# Patient Record
Sex: FEMALE | Race: BLACK OR AFRICAN AMERICAN | NOT HISPANIC OR LATINO
[De-identification: names, ages, dates, MRNs, and addresses within clinical notes are randomized per-mention and may not be internally consistent; named-entity substitution may affect disease eponyms.]

---

## 2018-05-16 ENCOUNTER — APPOINTMENT (OUTPATIENT)
Dept: OPHTHALMOLOGY | Facility: CLINIC | Age: 58
End: 2018-05-16
Payer: MEDICAID

## 2018-05-16 PROCEDURE — 92004 COMPRE OPH EXAM NEW PT 1/>: CPT

## 2020-04-03 ENCOUNTER — INPATIENT (INPATIENT)
Facility: HOSPITAL | Age: 60
LOS: 5 days | Discharge: HOME | End: 2020-04-09
Attending: HOSPITALIST | Admitting: HOSPITALIST
Payer: MEDICAID

## 2020-04-03 VITALS
SYSTOLIC BLOOD PRESSURE: 155 MMHG | TEMPERATURE: 100 F | OXYGEN SATURATION: 96 % | HEART RATE: 97 BPM | RESPIRATION RATE: 18 BRPM | DIASTOLIC BLOOD PRESSURE: 84 MMHG

## 2020-04-03 DIAGNOSIS — E87.6 HYPOKALEMIA: ICD-10-CM

## 2020-04-03 DIAGNOSIS — F25.9 SCHIZOAFFECTIVE DISORDER, UNSPECIFIED: ICD-10-CM

## 2020-04-03 DIAGNOSIS — Z29.9 ENCOUNTER FOR PROPHYLACTIC MEASURES, UNSPECIFIED: ICD-10-CM

## 2020-04-03 DIAGNOSIS — E87.1 HYPO-OSMOLALITY AND HYPONATREMIA: ICD-10-CM

## 2020-04-03 DIAGNOSIS — B34.9 VIRAL INFECTION, UNSPECIFIED: ICD-10-CM

## 2020-04-03 DIAGNOSIS — Z02.9 ENCOUNTER FOR ADMINISTRATIVE EXAMINATIONS, UNSPECIFIED: ICD-10-CM

## 2020-04-03 DIAGNOSIS — E11.9 TYPE 2 DIABETES MELLITUS WITHOUT COMPLICATIONS: ICD-10-CM

## 2020-04-03 DIAGNOSIS — J96.01 ACUTE RESPIRATORY FAILURE WITH HYPOXIA: ICD-10-CM

## 2020-04-03 DIAGNOSIS — I10 ESSENTIAL (PRIMARY) HYPERTENSION: ICD-10-CM

## 2020-04-03 LAB
ALBUMIN SERPL ELPH-MCNC: 3.8 G/DL — SIGNIFICANT CHANGE UP (ref 3.3–5)
ALP SERPL-CCNC: 59 U/L — SIGNIFICANT CHANGE UP (ref 40–120)
ALT FLD-CCNC: 20 U/L — SIGNIFICANT CHANGE UP (ref 4–33)
ANION GAP SERPL CALC-SCNC: 16 MMO/L — HIGH (ref 7–14)
APTT BLD: 30.5 SEC — SIGNIFICANT CHANGE UP (ref 27.5–36.3)
AST SERPL-CCNC: 25 U/L — SIGNIFICANT CHANGE UP (ref 4–32)
BASE EXCESS BLDV CALC-SCNC: 4.4 MMOL/L — SIGNIFICANT CHANGE UP
BASOPHILS # BLD AUTO: 0.02 K/UL — SIGNIFICANT CHANGE UP (ref 0–0.2)
BASOPHILS NFR BLD AUTO: 0.2 % — SIGNIFICANT CHANGE UP (ref 0–2)
BILIRUB SERPL-MCNC: 0.5 MG/DL — SIGNIFICANT CHANGE UP (ref 0.2–1.2)
BLOOD GAS VENOUS - CREATININE: 0.47 MG/DL — LOW (ref 0.5–1.3)
BUN SERPL-MCNC: 8 MG/DL — SIGNIFICANT CHANGE UP (ref 7–23)
CALCIUM SERPL-MCNC: 9.2 MG/DL — SIGNIFICANT CHANGE UP (ref 8.4–10.5)
CHLORIDE BLDV-SCNC: 99 MMOL/L — SIGNIFICANT CHANGE UP (ref 96–108)
CHLORIDE SERPL-SCNC: 92 MMOL/L — LOW (ref 98–107)
CK MB BLD-MCNC: 1.39 NG/ML — SIGNIFICANT CHANGE UP (ref 1–4.7)
CK SERPL-CCNC: 191 U/L — HIGH (ref 25–170)
CO2 SERPL-SCNC: 25 MMOL/L — SIGNIFICANT CHANGE UP (ref 22–31)
CREAT SERPL-MCNC: 0.45 MG/DL — LOW (ref 0.5–1.3)
CRP SERPL-MCNC: 160.2 MG/L — HIGH
D DIMER BLD IA.RAPID-MCNC: 607 NG/ML — SIGNIFICANT CHANGE UP
EOSINOPHIL # BLD AUTO: 0 K/UL — SIGNIFICANT CHANGE UP (ref 0–0.5)
EOSINOPHIL NFR BLD AUTO: 0 % — SIGNIFICANT CHANGE UP (ref 0–6)
ERYTHROCYTE [SEDIMENTATION RATE] IN BLOOD: 44 MM/HR — HIGH (ref 4–25)
FERRITIN SERPL-MCNC: 293.3 NG/ML — HIGH (ref 15–150)
GAS PNL BLDV: 135 MMOL/L — LOW (ref 136–146)
GLUCOSE BLDV-MCNC: 246 MG/DL — HIGH (ref 70–99)
GLUCOSE SERPL-MCNC: 261 MG/DL — HIGH (ref 70–99)
HCO3 BLDV-SCNC: 27 MMOL/L — SIGNIFICANT CHANGE UP (ref 20–27)
HCT VFR BLD CALC: 33.1 % — LOW (ref 34.5–45)
HCT VFR BLDV CALC: 38 % — SIGNIFICANT CHANGE UP (ref 34.5–45)
HGB BLD-MCNC: 11.9 G/DL — SIGNIFICANT CHANGE UP (ref 11.5–15.5)
HGB BLDV-MCNC: 12.4 G/DL — SIGNIFICANT CHANGE UP (ref 11.5–15.5)
IMM GRANULOCYTES NFR BLD AUTO: 2.2 % — HIGH (ref 0–1.5)
INR BLD: 1.2 — HIGH (ref 0.88–1.17)
LACTATE BLDV-MCNC: 1.7 MMOL/L — SIGNIFICANT CHANGE UP (ref 0.5–2)
LDH SERPL L TO P-CCNC: 418 U/L — HIGH (ref 135–225)
LYMPHOCYTES # BLD AUTO: 1.21 K/UL — SIGNIFICANT CHANGE UP (ref 1–3.3)
LYMPHOCYTES # BLD AUTO: 15.1 % — SIGNIFICANT CHANGE UP (ref 13–44)
MCHC RBC-ENTMCNC: 27 PG — SIGNIFICANT CHANGE UP (ref 27–34)
MCHC RBC-ENTMCNC: 36 % — SIGNIFICANT CHANGE UP (ref 32–36)
MCV RBC AUTO: 75.1 FL — LOW (ref 80–100)
MONOCYTES # BLD AUTO: 0.29 K/UL — SIGNIFICANT CHANGE UP (ref 0–0.9)
MONOCYTES NFR BLD AUTO: 3.6 % — SIGNIFICANT CHANGE UP (ref 2–14)
NEUTROPHILS # BLD AUTO: 6.32 K/UL — SIGNIFICANT CHANGE UP (ref 1.8–7.4)
NEUTROPHILS NFR BLD AUTO: 78.9 % — HIGH (ref 43–77)
NRBC # FLD: 0 K/UL — SIGNIFICANT CHANGE UP (ref 0–0)
PCO2 BLDV: 44 MMHG — SIGNIFICANT CHANGE UP (ref 41–51)
PH BLDV: 7.43 PH — SIGNIFICANT CHANGE UP (ref 7.32–7.43)
PLATELET # BLD AUTO: 398 K/UL — SIGNIFICANT CHANGE UP (ref 150–400)
PMV BLD: 10.4 FL — SIGNIFICANT CHANGE UP (ref 7–13)
PO2 BLDV: 35 MMHG — SIGNIFICANT CHANGE UP (ref 35–40)
POTASSIUM BLDV-SCNC: 2.9 MMOL/L — CRITICAL LOW (ref 3.4–4.5)
POTASSIUM SERPL-MCNC: 3.1 MMOL/L — LOW (ref 3.5–5.3)
POTASSIUM SERPL-SCNC: 3.1 MMOL/L — LOW (ref 3.5–5.3)
PROCALCITONIN SERPL-MCNC: 0.13 NG/ML — HIGH (ref 0.02–0.1)
PROT SERPL-MCNC: 7.4 G/DL — SIGNIFICANT CHANGE UP (ref 6–8.3)
PROTHROM AB SERPL-ACNC: 13.7 SEC — HIGH (ref 9.8–13.1)
RBC # BLD: 4.41 M/UL — SIGNIFICANT CHANGE UP (ref 3.8–5.2)
RBC # FLD: 13.3 % — SIGNIFICANT CHANGE UP (ref 10.3–14.5)
SAO2 % BLDV: 66.1 % — SIGNIFICANT CHANGE UP (ref 60–85)
SODIUM SERPL-SCNC: 133 MMOL/L — LOW (ref 135–145)
TROPONIN T, HIGH SENSITIVITY: 6 NG/L — SIGNIFICANT CHANGE UP (ref ?–14)
WBC # BLD: 8.02 K/UL — SIGNIFICANT CHANGE UP (ref 3.8–10.5)
WBC # FLD AUTO: 8.02 K/UL — SIGNIFICANT CHANGE UP (ref 3.8–10.5)

## 2020-04-03 PROCEDURE — 71045 X-RAY EXAM CHEST 1 VIEW: CPT | Mod: 26

## 2020-04-03 RX ORDER — ACETAMINOPHEN 500 MG
650 TABLET ORAL EVERY 6 HOURS
Refills: 0 | Status: DISCONTINUED | OUTPATIENT
Start: 2020-04-03 | End: 2020-04-09

## 2020-04-03 RX ORDER — ACETAMINOPHEN 500 MG
650 TABLET ORAL ONCE
Refills: 0 | Status: COMPLETED | OUTPATIENT
Start: 2020-04-03 | End: 2020-04-03

## 2020-04-03 RX ORDER — ENOXAPARIN SODIUM 100 MG/ML
40 INJECTION SUBCUTANEOUS ONCE
Refills: 0 | Status: COMPLETED | OUTPATIENT
Start: 2020-04-03 | End: 2020-04-03

## 2020-04-03 RX ORDER — ENOXAPARIN SODIUM 100 MG/ML
40 INJECTION SUBCUTANEOUS DAILY
Refills: 0 | Status: DISCONTINUED | OUTPATIENT
Start: 2020-04-04 | End: 2020-04-09

## 2020-04-03 RX ORDER — ALBUTEROL 90 UG/1
2 AEROSOL, METERED ORAL EVERY 6 HOURS
Refills: 0 | Status: DISCONTINUED | OUTPATIENT
Start: 2020-04-03 | End: 2020-04-09

## 2020-04-03 RX ORDER — POTASSIUM CHLORIDE 20 MEQ
40 PACKET (EA) ORAL ONCE
Refills: 0 | Status: COMPLETED | OUTPATIENT
Start: 2020-04-03 | End: 2020-04-03

## 2020-04-03 RX ADMIN — Medication 40 MILLIEQUIVALENT(S): at 12:28

## 2020-04-03 RX ADMIN — Medication 650 MILLIGRAM(S): at 10:34

## 2020-04-03 RX ADMIN — Medication 40 MILLIGRAM(S): at 11:03

## 2020-04-03 RX ADMIN — ENOXAPARIN SODIUM 40 MILLIGRAM(S): 100 INJECTION SUBCUTANEOUS at 11:03

## 2020-04-03 NOTE — ED ADULT NURSE NOTE - CHIEF COMPLAINT QUOTE
Pt brought in by EMS from home complaining of SOB and fever x few days. Pt SpO2 on RA was 88%, pt arrives on a non rebreather, SpO2 96%. Pt was seen at [facility] yesterday and arrives today with IV still in L AC;.

## 2020-04-03 NOTE — ED PROVIDER NOTE - CLINICAL SUMMARY MEDICAL DECISION MAKING FREE TEXT BOX
59F with hx of schizophrenia, ?DM, ?HTN presenting with 2 weeks of fever, chills, cough and 2 days of dyspnea. Ill-appearing, febilre, tachypneic and hypoxic. Will check COVID labs, CXR, EKG, likely tba.

## 2020-04-03 NOTE — ED PROVIDER NOTE - PHYSICAL EXAMINATION
GENERAL: febrile, ill-appearing  HEAD: normocephalic, atraumatic  HEENT: normal conjunctiva, oral mucosa moist, neck supple  CARDIAC: tachycardic  PULM: tachypneic to 30s, satting high 80s on RA at rest, mid-80s during exertion  GI: abdomen nondistended, soft, nontender, no guarding or rebound tenderness  NEURO: alert and oriented x 3, normal speech, PERRLA, EOMI, no focal motor or sensory deficits  MSK: no visible deformities, no peripheral edema, calf tenderness/redness/swelling  SKIN: no visible rashes, dry, well-perfused  PSYCH: appropriate mood and affect

## 2020-04-03 NOTE — ED PROVIDER NOTE - NS ED ROS FT
GENERAL: + fever, chills  HEENT: + cough  CARDIAC: no chest pain, palpitations, lightheadedness  PULM: + dyspnea  GI: no abdominal pain, nausea, vomiting, diarrhea, constipation, melena, hematochezia  : no urinary dysuria, frequency, incontinence, hematuria  NEURO: no headache, motor weakness, sensory changes  MSK: no joint or muscle pain  SKIN: no rashes, hives  HEME: no active bleeding, bruising

## 2020-04-03 NOTE — H&P ADULT - HISTORY OF PRESENT ILLNESS
58 y/o Female. PMH of Schizophrenia (not on medications), DM and HTN (not on medications). Presents with a complain of shortness of breath. Patient states that for the past 2 weeks she has been expiriencing fevers, chills and a non-productive cough and has also developed shortness of breath. PER EMS patient went to [facility] yesterday and was sent home. On arrival to the ED patient found to be hypoxic to the low 80's on RA. Patient denied any other symptom, no nausea, vomiting, chest pain, abdominal pain or any other urinary symptoms.   Patients MELCHOR Figueroa (#[phone])

## 2020-04-03 NOTE — ED ADULT TRIAGE NOTE - CHIEF COMPLAINT QUOTE
Pt brought in by EMS from home complaining of SOB and fever x few days. Pt SpO2 on RA was 88%, pt arrives on a non rebreather, SpO2 96%. Pt was seen at [facility] yesterday and arrives today with IV still in L AC;.

## 2020-04-03 NOTE — H&P ADULT - PROBLEM SELECTOR PLAN 1
Likely secondary to Viral URI, r/p COVID 19.   COVID 19 PCR Pending.  CXR Airspace opacities in the bilateral lower lobes and the lateral aspect of the left upper lobe. Perihilar opacities are also suspected. Cardiomediastinal silhouette is intact.  Albuterol PRN SOB/WHeezing  Tylenol PRN Fever  Maintain O2% >90%, currently on NC.

## 2020-04-03 NOTE — ED PROVIDER NOTE - OBJECTIVE STATEMENT
59F with hx of schizophrenia, ?DM, ?HTN presenting with 2 weeks of fever, chills, cough and 2 days of dyspnea. Per EMS, patient first went to [facility] yesterday, had a work-up done, went home. Unclear if patient was discharged or eloped. Per EMS, patient would desat to low 80s on RA. In ED, patient satting high 80s on RA at rest, desatting to mid-80s when exerting herself. Patient denies abdominal pain, n/v/d, recent travel, sick contacts, leg pain/swelling/redness, chest pain.

## 2020-04-03 NOTE — ED PROVIDER NOTE - ATTENDING CONTRIBUTION TO CARE
59 f presenting with symptoms suspicious for covid 19 hypoxic resp failure.  will send covid labs, provide 02 therapy. admit for further treatment and monitoring

## 2020-04-03 NOTE — H&P ADULT - ASSESSMENT
60 y/o Female. PMH of Schizophrenia (not on medications), DM and HTN (not on medications). Presents with shortness of breath, fevers, chills and a non-productive cough. On arrival to the ED patient found to be hypoxic to the low 80's on RA. Admitted for Acute Respiratory Failure with Hypoxia.

## 2020-04-03 NOTE — ED PROVIDER NOTE - PROGRESS NOTE DETAILS
Figueroa: admitted to hospitalist for COVID work up, hypoxia. Figueroa: spoke with patient's sister. Patient's healthcare proxy is her mother. Per sister, patient is full-code. Will revoke patient's DNR/DNI status.

## 2020-04-04 DIAGNOSIS — B97.21 SARS-ASSOCIATED CORONAVIRUS AS THE CAUSE OF DISEASES CLASSIFIED ELSEWHERE: ICD-10-CM

## 2020-04-04 LAB
ALBUMIN SERPL ELPH-MCNC: 3.5 G/DL — SIGNIFICANT CHANGE UP (ref 3.3–5)
ALP SERPL-CCNC: 59 U/L — SIGNIFICANT CHANGE UP (ref 40–120)
ALT FLD-CCNC: 19 U/L — SIGNIFICANT CHANGE UP (ref 4–33)
ANION GAP SERPL CALC-SCNC: 18 MMO/L — HIGH (ref 7–14)
ANION GAP SERPL CALC-SCNC: 18 MMO/L — HIGH (ref 7–14)
AST SERPL-CCNC: 25 U/L — SIGNIFICANT CHANGE UP (ref 4–32)
BASOPHILS # BLD AUTO: 0.04 K/UL — SIGNIFICANT CHANGE UP (ref 0–0.2)
BASOPHILS NFR BLD AUTO: 0.5 % — SIGNIFICANT CHANGE UP (ref 0–2)
BILIRUB SERPL-MCNC: 0.4 MG/DL — SIGNIFICANT CHANGE UP (ref 0.2–1.2)
BUN SERPL-MCNC: 10 MG/DL — SIGNIFICANT CHANGE UP (ref 7–23)
BUN SERPL-MCNC: 10 MG/DL — SIGNIFICANT CHANGE UP (ref 7–23)
CALCIUM SERPL-MCNC: 9.3 MG/DL — SIGNIFICANT CHANGE UP (ref 8.4–10.5)
CALCIUM SERPL-MCNC: 9.3 MG/DL — SIGNIFICANT CHANGE UP (ref 8.4–10.5)
CHLORIDE SERPL-SCNC: 94 MMOL/L — LOW (ref 98–107)
CHLORIDE SERPL-SCNC: 94 MMOL/L — LOW (ref 98–107)
CO2 SERPL-SCNC: 23 MMOL/L — SIGNIFICANT CHANGE UP (ref 22–31)
CO2 SERPL-SCNC: 23 MMOL/L — SIGNIFICANT CHANGE UP (ref 22–31)
CREAT SERPL-MCNC: 0.37 MG/DL — LOW (ref 0.5–1.3)
CREAT SERPL-MCNC: 0.37 MG/DL — LOW (ref 0.5–1.3)
EOSINOPHIL # BLD AUTO: 0 K/UL — SIGNIFICANT CHANGE UP (ref 0–0.5)
EOSINOPHIL NFR BLD AUTO: 0 % — SIGNIFICANT CHANGE UP (ref 0–6)
GLUCOSE BLDC GLUCOMTR-MCNC: 313 MG/DL — HIGH (ref 70–99)
GLUCOSE BLDC GLUCOMTR-MCNC: 317 MG/DL — HIGH (ref 70–99)
GLUCOSE SERPL-MCNC: 217 MG/DL — HIGH (ref 70–99)
GLUCOSE SERPL-MCNC: 217 MG/DL — HIGH (ref 70–99)
HCT VFR BLD CALC: 33.5 % — LOW (ref 34.5–45)
HCV AB S/CO SERPL IA: 1.57 S/CO — HIGH (ref 0–0.99)
HCV AB SERPL-IMP: SIGNIFICANT CHANGE UP
HGB BLD-MCNC: 12.1 G/DL — SIGNIFICANT CHANGE UP (ref 11.5–15.5)
IMM GRANULOCYTES NFR BLD AUTO: 3 % — HIGH (ref 0–1.5)
LYMPHOCYTES # BLD AUTO: 1.22 K/UL — SIGNIFICANT CHANGE UP (ref 1–3.3)
LYMPHOCYTES # BLD AUTO: 15.3 % — SIGNIFICANT CHANGE UP (ref 13–44)
MAGNESIUM SERPL-MCNC: 2.1 MG/DL — SIGNIFICANT CHANGE UP (ref 1.6–2.6)
MCHC RBC-ENTMCNC: 27.3 PG — SIGNIFICANT CHANGE UP (ref 27–34)
MCHC RBC-ENTMCNC: 36.1 % — HIGH (ref 32–36)
MCV RBC AUTO: 75.5 FL — LOW (ref 80–100)
MONOCYTES # BLD AUTO: 0.25 K/UL — SIGNIFICANT CHANGE UP (ref 0–0.9)
MONOCYTES NFR BLD AUTO: 3.1 % — SIGNIFICANT CHANGE UP (ref 2–14)
NEUTROPHILS # BLD AUTO: 6.23 K/UL — SIGNIFICANT CHANGE UP (ref 1.8–7.4)
NEUTROPHILS NFR BLD AUTO: 78.1 % — HIGH (ref 43–77)
NRBC # FLD: 0 K/UL — SIGNIFICANT CHANGE UP (ref 0–0)
PHOSPHATE SERPL-MCNC: 2.4 MG/DL — LOW (ref 2.5–4.5)
PLATELET # BLD AUTO: 433 K/UL — HIGH (ref 150–400)
PMV BLD: 11 FL — SIGNIFICANT CHANGE UP (ref 7–13)
POTASSIUM SERPL-MCNC: 4 MMOL/L — SIGNIFICANT CHANGE UP (ref 3.5–5.3)
POTASSIUM SERPL-MCNC: 4 MMOL/L — SIGNIFICANT CHANGE UP (ref 3.5–5.3)
POTASSIUM SERPL-SCNC: 4 MMOL/L — SIGNIFICANT CHANGE UP (ref 3.5–5.3)
POTASSIUM SERPL-SCNC: 4 MMOL/L — SIGNIFICANT CHANGE UP (ref 3.5–5.3)
PROT SERPL-MCNC: 7.1 G/DL — SIGNIFICANT CHANGE UP (ref 6–8.3)
RBC # BLD: 4.44 M/UL — SIGNIFICANT CHANGE UP (ref 3.8–5.2)
RBC # FLD: 13.5 % — SIGNIFICANT CHANGE UP (ref 10.3–14.5)
SARS-COV-2 RNA SPEC QL NAA+PROBE: DETECTED
SODIUM SERPL-SCNC: 135 MMOL/L — SIGNIFICANT CHANGE UP (ref 135–145)
SODIUM SERPL-SCNC: 135 MMOL/L — SIGNIFICANT CHANGE UP (ref 135–145)
WBC # BLD: 7.98 K/UL — SIGNIFICANT CHANGE UP (ref 3.8–10.5)
WBC # FLD AUTO: 7.98 K/UL — SIGNIFICANT CHANGE UP (ref 3.8–10.5)

## 2020-04-04 PROCEDURE — 99233 SBSQ HOSP IP/OBS HIGH 50: CPT

## 2020-04-04 RX ORDER — DEXTROSE 50 % IN WATER 50 %
12.5 SYRINGE (ML) INTRAVENOUS ONCE
Refills: 0 | Status: DISCONTINUED | OUTPATIENT
Start: 2020-04-04 | End: 2020-04-09

## 2020-04-04 RX ORDER — POLYETHYLENE GLYCOL 3350 17 G/17G
17 POWDER, FOR SOLUTION ORAL DAILY
Refills: 0 | Status: DISCONTINUED | OUTPATIENT
Start: 2020-04-04 | End: 2020-04-09

## 2020-04-04 RX ORDER — SENNA PLUS 8.6 MG/1
2 TABLET ORAL AT BEDTIME
Refills: 0 | Status: DISCONTINUED | OUTPATIENT
Start: 2020-04-04 | End: 2020-04-09

## 2020-04-04 RX ORDER — DEXTROSE 50 % IN WATER 50 %
25 SYRINGE (ML) INTRAVENOUS ONCE
Refills: 0 | Status: DISCONTINUED | OUTPATIENT
Start: 2020-04-04 | End: 2020-04-09

## 2020-04-04 RX ORDER — SODIUM CHLORIDE 9 MG/ML
1000 INJECTION, SOLUTION INTRAVENOUS
Refills: 0 | Status: DISCONTINUED | OUTPATIENT
Start: 2020-04-04 | End: 2020-04-09

## 2020-04-04 RX ORDER — INSULIN LISPRO 100/ML
VIAL (ML) SUBCUTANEOUS
Refills: 0 | Status: DISCONTINUED | OUTPATIENT
Start: 2020-04-04 | End: 2020-04-09

## 2020-04-04 RX ORDER — DEXTROSE 50 % IN WATER 50 %
15 SYRINGE (ML) INTRAVENOUS ONCE
Refills: 0 | Status: DISCONTINUED | OUTPATIENT
Start: 2020-04-04 | End: 2020-04-09

## 2020-04-04 RX ORDER — INSULIN LISPRO 100/ML
VIAL (ML) SUBCUTANEOUS AT BEDTIME
Refills: 0 | Status: DISCONTINUED | OUTPATIENT
Start: 2020-04-04 | End: 2020-04-09

## 2020-04-04 RX ORDER — GLUCAGON INJECTION, SOLUTION 0.5 MG/.1ML
1 INJECTION, SOLUTION SUBCUTANEOUS ONCE
Refills: 0 | Status: DISCONTINUED | OUTPATIENT
Start: 2020-04-04 | End: 2020-04-09

## 2020-04-04 RX ORDER — SODIUM,POTASSIUM PHOSPHATES 278-250MG
1 POWDER IN PACKET (EA) ORAL
Refills: 0 | Status: COMPLETED | OUTPATIENT
Start: 2020-04-04 | End: 2020-04-05

## 2020-04-04 RX ADMIN — SENNA PLUS 2 TABLET(S): 8.6 TABLET ORAL at 23:47

## 2020-04-04 RX ADMIN — Medication 2: at 23:47

## 2020-04-04 RX ADMIN — Medication 2: at 08:59

## 2020-04-04 RX ADMIN — Medication 1 PACKET(S): at 18:37

## 2020-04-04 RX ADMIN — ENOXAPARIN SODIUM 40 MILLIGRAM(S): 100 INJECTION SUBCUTANEOUS at 12:25

## 2020-04-04 RX ADMIN — Medication 1 PACKET(S): at 10:14

## 2020-04-04 RX ADMIN — Medication 1 PACKET(S): at 12:25

## 2020-04-04 RX ADMIN — Medication 4: at 12:23

## 2020-04-04 NOTE — PROGRESS NOTE ADULT - PROBLEM SELECTOR PLAN 1
Likely secondary to COVID 19 PCR positive   Saturating % on 2 L   CXR Airspace opacities in the bilateral lower lobes and the lateral aspect of the left upper lobe. Perihilar opacities are also suspected. Cardiomediastinal silhouette is intact.  On Albuterol PRN for  SOB/Wheezing  Attempt to titrate down O2 as tolerated to maintain sats of 94% and above

## 2020-04-04 NOTE — PROGRESS NOTE ADULT - ASSESSMENT
58 y/o Female. PMH of Schizophrenia (not on medications), DM and HTN (not on medications). Presents with shortness of breath, fevers, chills and a non-productive cough. On arrival to the ED patient found to be hypoxic to the low 80's on RA. Admitted for Acute Respiratory Failure with Hypoxia.

## 2020-04-04 NOTE — PROGRESS NOTE ADULT - SUBJECTIVE AND OBJECTIVE BOX
PROGRESS NOTE:     Patient is a 59y old  Female who presents with a chief complaint of Shortness of Breath (03 Apr 2020 19:52)      SUBJECTIVE / OVERNIGHT EVENTS:    ADDITIONAL REVIEW OF SYSTEMS:    MEDICATIONS  (STANDING):  dextrose 5%. 1000 milliLiter(s) (50 mL/Hr) IV Continuous <Continuous>  dextrose 50% Injectable 12.5 Gram(s) IV Push once  dextrose 50% Injectable 25 Gram(s) IV Push once  dextrose 50% Injectable 25 Gram(s) IV Push once  enoxaparin Injectable 40 milliGRAM(s) SubCutaneous daily  insulin lispro (HumaLOG) corrective regimen sliding scale   SubCutaneous three times a day before meals  insulin lispro (HumaLOG) corrective regimen sliding scale   SubCutaneous at bedtime  potassium phosphate / sodium phosphate powder 1 Packet(s) Oral three times a day with meals    MEDICATIONS  (PRN):  acetaminophen   Tablet .. 650 milliGRAM(s) Oral every 6 hours PRN Temp greater or equal to 38C (100.4F), Mild Pain (1 - 3)  ALBUTerol    90 MICROgram(s) HFA Inhaler 2 Puff(s) Inhalation every 6 hours PRN Shortness of Breath and/or Wheezing  dextrose 40% Gel 15 Gram(s) Oral once PRN Blood Glucose LESS THAN 70 milliGRAM(s)/deciliter  glucagon  Injectable 1 milliGRAM(s) IntraMuscular once PRN Glucose LESS THAN 70 milligrams/deciliter      CAPILLARY BLOOD GLUCOSE        I&O's Summary    03 Apr 2020 07:01  -  04 Apr 2020 07:00  --------------------------------------------------------  IN: 360 mL / OUT: 0 mL / NET: 360 mL        PHYSICAL EXAM:  Vital Signs Last 24 Hrs  T(C): 36.7 (04 Apr 2020 01:33), Max: 37.1 (03 Apr 2020 14:35)  T(F): 98.1 (04 Apr 2020 01:33), Max: 98.8 (03 Apr 2020 14:35)  HR: 82 (04 Apr 2020 01:33) (82 - 91)  BP: 129/60 (04 Apr 2020 01:33) (129/60 - 149/72)  BP(mean): --  RR: 18 (04 Apr 2020 01:33) (18 - 20)  SpO2: 94% (04 Apr 2020 01:33) (94% - 100%)    CONSTITUTIONAL: NAD, well-developed  RESPIRATORY: Normal respiratory effort; lungs are clear to auscultation bilaterally  CARDIOVASCULAR: Regular rate and rhythm, normal S1 and S2, no murmur/rub/gallop; No lower extremity edema; Peripheral pulses are 2+ bilaterally  ABDOMEN: Nontender to palpation, normoactive bowel sounds, no rebound/guarding; No hepatosplenomegaly  MUSCLOSKELETAL: no clubbing or cyanosis of digits; no joint swelling or tenderness to palpation  PSYCH: A+O to person, place, and time; affect appropriate  NEURO:  SKIN:    LABS:                        12.1   7.98  )-----------( 433      ( 04 Apr 2020 06:00 )             33.5     04-04    135  |  94<L>  |  10  ----------------------------<  217<H>  4.0   |  23  |  0.37<L>    Ca    9.3      04 Apr 2020 06:00  Phos  2.4     04-04  Mg     2.1     04-04    TPro  7.1  /  Alb  3.5  /  TBili  0.4  /  DBili  x   /  AST  25  /  ALT  19  /  AlkPhos  59  04-04    PT/INR - ( 03 Apr 2020 10:15 )   PT: 13.7 SEC;   INR: 1.20          PTT - ( 03 Apr 2020 10:15 )  PTT:30.5 SEC  CARDIAC MARKERS ( 03 Apr 2020 10:15 )  x     / x     / 191 u/L / 1.39 ng/mL / x                RADIOLOGY & ADDITIONAL TESTS:  Results Reviewed:   Imaging Personally Reviewed:  Electrocardiogram Personally Reviewed:    COORDINATION OF CARE:  Care Discussed with Consultants/Other Providers [Y/N]:  Prior or Outpatient Records Reviewed [Y/N]: PROGRESS NOTE:     Patient is a 59y old  Female who presents with a chief complaint of Shortness of Breath (03 Apr 2020 19:52)      SUBJECTIVE / OVERNIGHT EVENTS: Pt seen and examined by me   Feels improvement in SOB   Intermittent cough     ADDITIONAL REVIEW OF SYSTEMS:    MEDICATIONS  (STANDING):  dextrose 5%. 1000 milliLiter(s) (50 mL/Hr) IV Continuous <Continuous>  dextrose 50% Injectable 12.5 Gram(s) IV Push once  dextrose 50% Injectable 25 Gram(s) IV Push once  dextrose 50% Injectable 25 Gram(s) IV Push once  enoxaparin Injectable 40 milliGRAM(s) SubCutaneous daily  insulin lispro (HumaLOG) corrective regimen sliding scale   SubCutaneous three times a day before meals  insulin lispro (HumaLOG) corrective regimen sliding scale   SubCutaneous at bedtime  potassium phosphate / sodium phosphate powder 1 Packet(s) Oral three times a day with meals    MEDICATIONS  (PRN):  acetaminophen   Tablet .. 650 milliGRAM(s) Oral every 6 hours PRN Temp greater or equal to 38C (100.4F), Mild Pain (1 - 3)  ALBUTerol    90 MICROgram(s) HFA Inhaler 2 Puff(s) Inhalation every 6 hours PRN Shortness of Breath and/or Wheezing  dextrose 40% Gel 15 Gram(s) Oral once PRN Blood Glucose LESS THAN 70 milliGRAM(s)/deciliter  glucagon  Injectable 1 milliGRAM(s) IntraMuscular once PRN Glucose LESS THAN 70 milligrams/deciliter      CAPILLARY BLOOD GLUCOSE        I&O's Summary    03 Apr 2020 07:01  -  04 Apr 2020 07:00  --------------------------------------------------------  IN: 360 mL / OUT: 0 mL / NET: 360 mL        PHYSICAL EXAM:  Vital Signs Last 24 Hrs  T(C): 36.7 (04 Apr 2020 01:33), Max: 37.1 (03 Apr 2020 14:35)  T(F): 98.1 (04 Apr 2020 01:33), Max: 98.8 (03 Apr 2020 14:35)  HR: 82 (04 Apr 2020 01:33) (82 - 91)  BP: 129/60 (04 Apr 2020 01:33) (129/60 - 149/72)  BP(mean): --  RR: 18 (04 Apr 2020 01:33) (18 - 20)  SpO2: 94% (04 Apr 2020 01:33) (94% - 100%)    CONSTITUTIONAL: NAD, well-developed  RESPIRATORY: Normal respiratory effort; lungs are clear to auscultation bilaterally, on supplemental O2  CARDIOVASCULAR: Regular rate and rhythm, normal S1 and S2, no murmur/rub/gallop; No lower extremity edema; Peripheral pulses are 2+ bilaterally  ABDOMEN: Nontender to palpation, normoactive bowel sounds, no rebound/guarding; No hepatosplenomegaly  MUSCLOSKELETAL: no clubbing or cyanosis of digits; no joint swelling or tenderness to palpation  PSYCH: A+O to person, place, and time; affect appropriate  NEURO: No fcoal deficits   SKIN:No rash     LABS:                        12.1   7.98  )-----------( 433      ( 04 Apr 2020 06:00 )             33.5     04-04    135  |  94<L>  |  10  ----------------------------<  217<H>  4.0   |  23  |  0.37<L>    Ca    9.3      04 Apr 2020 06:00  Phos  2.4     04-04  Mg     2.1     04-04    TPro  7.1  /  Alb  3.5  /  TBili  0.4  /  DBili  x   /  AST  25  /  ALT  19  /  AlkPhos  59  04-04    PT/INR - ( 03 Apr 2020 10:15 )   PT: 13.7 SEC;   INR: 1.20          PTT - ( 03 Apr 2020 10:15 )  PTT:30.5 SEC  CARDIAC MARKERS ( 03 Apr 2020 10:15 )  x     / x     / 191 u/L / 1.39 ng/mL / x                RADIOLOGY & ADDITIONAL TESTS:  Results Reviewed:   Imaging Personally Reviewed:  Electrocardiogram Personally Reviewed:    COORDINATION OF CARE:  Care Discussed with Consultants/Other Providers [Y/N]:  Prior or Outpatient Records Reviewed [Y/N]:

## 2020-04-05 LAB
HCV RNA FLD QL NAA+PROBE: SIGNIFICANT CHANGE UP
HCV RNA SPEC QL PROBE+SIG AMP: NOT DETECTED — SIGNIFICANT CHANGE UP

## 2020-04-05 PROCEDURE — 99233 SBSQ HOSP IP/OBS HIGH 50: CPT

## 2020-04-05 RX ORDER — INSULIN GLARGINE 100 [IU]/ML
8 INJECTION, SOLUTION SUBCUTANEOUS AT BEDTIME
Refills: 0 | Status: DISCONTINUED | OUTPATIENT
Start: 2020-04-05 | End: 2020-04-08

## 2020-04-05 RX ADMIN — Medication 1 PACKET(S): at 11:21

## 2020-04-05 NOTE — PROGRESS NOTE ADULT - SUBJECTIVE AND OBJECTIVE BOX
PROGRESS NOTE:     Patient is a 59y old  Female who presents with a chief complaint of Shortness of Breath (03 Apr 2020 19:52)      SUBJECTIVE / OVERNIGHT EVENTS: Pt seen and examined by me   Intermittent cough  with SOB   Saturating 94-95% on  2 L     ADDITIONAL REVIEW OF SYSTEMS:    MEDICATIONS  (STANDING):  dextrose 5%. 1000 milliLiter(s) (50 mL/Hr) IV Continuous <Continuous>  dextrose 50% Injectable 12.5 Gram(s) IV Push once  dextrose 50% Injectable 25 Gram(s) IV Push once  dextrose 50% Injectable 25 Gram(s) IV Push once  enoxaparin Injectable 40 milliGRAM(s) SubCutaneous daily  insulin lispro (HumaLOG) corrective regimen sliding scale   SubCutaneous three times a day before meals  insulin lispro (HumaLOG) corrective regimen sliding scale   SubCutaneous at bedtime  potassium phosphate / sodium phosphate powder 1 Packet(s) Oral three times a day with meals    MEDICATIONS  (PRN):  acetaminophen   Tablet .. 650 milliGRAM(s) Oral every 6 hours PRN Temp greater or equal to 38C (100.4F), Mild Pain (1 - 3)  ALBUTerol    90 MICROgram(s) HFA Inhaler 2 Puff(s) Inhalation every 6 hours PRN Shortness of Breath and/or Wheezing  dextrose 40% Gel 15 Gram(s) Oral once PRN Blood Glucose LESS THAN 70 milliGRAM(s)/deciliter  glucagon  Injectable 1 milliGRAM(s) IntraMuscular once PRN Glucose LESS THAN 70 milligrams/deciliter      CAPILLARY BLOOD GLUCOSE        I&O's Summary    03 Apr 2020 07:01  -  04 Apr 2020 07:00  --------------------------------------------------------  IN: 360 mL / OUT: 0 mL / NET: 360 mL        PHYSICAL EXAM:  Vital Signs Last 24 Hrs  T(C): 36.7 (04 Apr 2020 01:33), Max: 37.1 (03 Apr 2020 14:35)  T(F): 98.1 (04 Apr 2020 01:33), Max: 98.8 (03 Apr 2020 14:35)  HR: 82 (04 Apr 2020 01:33) (82 - 91)  BP: 129/60 (04 Apr 2020 01:33) (129/60 - 149/72)  BP(mean): --  RR: 18 (04 Apr 2020 01:33) (18 - 20)  SpO2: 94% (04 Apr 2020 01:33) (94% - 100%)    CONSTITUTIONAL: NAD, well-developed  RESPIRATORY: Normal respiratory effort; lungs are clear to auscultation bilaterally, on supplemental O2  CARDIOVASCULAR: Regular rate and rhythm, normal S1 and S2, no murmur/rub/gallop; No lower extremity edema; Peripheral pulses are 2+ bilaterally  ABDOMEN: Nontender to palpation, normoactive bowel sounds, no rebound/guarding; No hepatosplenomegaly  MUSCLOSKELETAL: no clubbing or cyanosis of digits; no joint swelling or tenderness to palpation  PSYCH: A+O to person, place, and time; affect appropriate  NEURO: No fcoal deficits   SKIN:No rash     LABS:                        12.1   7.98  )-----------( 433      ( 04 Apr 2020 06:00 )             33.5     04-04    135  |  94<L>  |  10  ----------------------------<  217<H>  4.0   |  23  |  0.37<L>    Ca    9.3      04 Apr 2020 06:00  Phos  2.4     04-04  Mg     2.1     04-04    TPro  7.1  /  Alb  3.5  /  TBili  0.4  /  DBili  x   /  AST  25  /  ALT  19  /  AlkPhos  59  04-04    PT/INR - ( 03 Apr 2020 10:15 )   PT: 13.7 SEC;   INR: 1.20          PTT - ( 03 Apr 2020 10:15 )  PTT:30.5 SEC  CARDIAC MARKERS ( 03 Apr 2020 10:15 )  x     / x     / 191 u/L / 1.39 ng/mL / x                RADIOLOGY & ADDITIONAL TESTS:  Results Reviewed:   Imaging Personally Reviewed:  Electrocardiogram Personally Reviewed:    COORDINATION OF CARE:  Care Discussed with Consultants/Other Providers [Y/N]:  Prior or Outpatient Records Reviewed [Y/N]:

## 2020-04-06 LAB
GLUCOSE BLDC GLUCOMTR-MCNC: 252 MG/DL — HIGH (ref 70–99)
GLUCOSE BLDC GLUCOMTR-MCNC: 291 MG/DL — HIGH (ref 70–99)
GLUCOSE BLDC GLUCOMTR-MCNC: 305 MG/DL — HIGH (ref 70–99)
GLUCOSE BLDC GLUCOMTR-MCNC: 378 MG/DL — HIGH (ref 70–99)

## 2020-04-06 PROCEDURE — 99232 SBSQ HOSP IP/OBS MODERATE 35: CPT

## 2020-04-06 RX ADMIN — Medication 2: at 23:58

## 2020-04-06 RX ADMIN — ENOXAPARIN SODIUM 40 MILLIGRAM(S): 100 INJECTION SUBCUTANEOUS at 11:36

## 2020-04-06 RX ADMIN — Medication 3: at 09:00

## 2020-04-06 RX ADMIN — Medication 4: at 17:24

## 2020-04-06 RX ADMIN — SENNA PLUS 2 TABLET(S): 8.6 TABLET ORAL at 23:47

## 2020-04-06 RX ADMIN — Medication 3: at 12:20

## 2020-04-06 NOTE — PROGRESS NOTE ADULT - SUBJECTIVE AND OBJECTIVE BOX
PROGRESS NOTE:     Patient is a 59y old  Female who presents with a chief complaint of Shortness of Breath (05 Apr 2020 13:10)      SUBJECTIVE / OVERNIGHT EVENTS: Pt says she feels fine, wants to go home.     ADDITIONAL REVIEW OF SYSTEMS:    MEDICATIONS  (STANDING):  dextrose 5%. 1000 milliLiter(s) (50 mL/Hr) IV Continuous <Continuous>  dextrose 50% Injectable 12.5 Gram(s) IV Push once  dextrose 50% Injectable 25 Gram(s) IV Push once  dextrose 50% Injectable 25 Gram(s) IV Push once  enoxaparin Injectable 40 milliGRAM(s) SubCutaneous daily  insulin glargine Injectable (LANTUS) 8 Unit(s) SubCutaneous at bedtime  insulin lispro (HumaLOG) corrective regimen sliding scale   SubCutaneous three times a day before meals  insulin lispro (HumaLOG) corrective regimen sliding scale   SubCutaneous at bedtime  senna 2 Tablet(s) Oral at bedtime    MEDICATIONS  (PRN):  acetaminophen   Tablet .. 650 milliGRAM(s) Oral every 6 hours PRN Temp greater or equal to 38C (100.4F), Mild Pain (1 - 3)  ALBUTerol    90 MICROgram(s) HFA Inhaler 2 Puff(s) Inhalation every 6 hours PRN Shortness of Breath and/or Wheezing  dextrose 40% Gel 15 Gram(s) Oral once PRN Blood Glucose LESS THAN 70 milliGRAM(s)/deciliter  glucagon  Injectable 1 milliGRAM(s) IntraMuscular once PRN Glucose LESS THAN 70 milligrams/deciliter  polyethylene glycol 3350 17 Gram(s) Oral daily PRN Constipation      CAPILLARY BLOOD GLUCOSE      POCT Blood Glucose.: 291 mg/dL (06 Apr 2020 12:18)  POCT Blood Glucose.: 252 mg/dL (06 Apr 2020 08:48)    I&O's Summary    05 Apr 2020 07:01  -  06 Apr 2020 07:00  --------------------------------------------------------  IN: 480 mL / OUT: 0 mL / NET: 480 mL        PHYSICAL EXAM:  Vital Signs Last 24 Hrs  T(C): 36.8 (06 Apr 2020 10:56), Max: 37.7 (06 Apr 2020 01:49)  T(F): 98.3 (06 Apr 2020 10:56), Max: 99.9 (06 Apr 2020 01:49)  HR: 94 (06 Apr 2020 10:56) (94 - 100)  BP: 128/68 (06 Apr 2020 10:56) (127/66 - 143/77)  BP(mean): 80 (06 Apr 2020 01:49) (80 - 80)  RR: 18 (06 Apr 2020 10:56) (16 - 20)  SpO2: 91% (06 Apr 2020 10:56) (91% - 94%)    CONSTITUTIONAL: NAD, well-developed  RESPIRATORY: Normal respiratory effort; lungs are clear to auscultation bilaterally  CARDIOVASCULAR: Tachycardia, normal S1 and S2, no murmur/rub/gallop; No lower extremity edema; Peripheral pulses are 2+ bilaterally  ABDOMEN: Nontender to palpation, normoactive bowel sounds, no rebound/guarding; No hepatosplenomegaly  MUSCLOSKELETAL: no clubbing or cyanosis of digits; no joint swelling or tenderness to palpation  PSYCH: A+O to person, place, and time; affect appropriate    LABS:                      RADIOLOGY & ADDITIONAL TESTS:  Results Reviewed:   Imaging Personally Reviewed:  Electrocardiogram Personally Reviewed:    COORDINATION OF CARE:  Care Discussed with Consultants/Other Providers [Y/N]:  Prior or Outpatient Records Reviewed [Y/N]:

## 2020-04-06 NOTE — PROVIDER CONTACT NOTE (OTHER) - ASSESSMENT
alert and oriented x4, VS stable, resting comfortably, 1L nasal cannula satting 93%. Patient informed of importance of blood tests

## 2020-04-06 NOTE — PROGRESS NOTE ADULT - PROBLEM SELECTOR PLAN 1
Likely secondary to COVID 19 infection   Saturating 91-98% on 1 L nc, desats to 87% on RA   CXR Airspace opacities in the bilateral lower lobes and the lateral aspect of the left upper lobe. Perihilar opacities are also suspected.   On Albuterol PRN for  SOB/Wheezing  Attempt to wean off O2 as tolerated

## 2020-04-06 NOTE — PROVIDER CONTACT NOTE (OTHER) - ASSESSMENT
alert and oriented x4, VS stable, resting comfortably, 1L nasal cannula satting 93%. Patient informed of risks of not monitoring VS

## 2020-04-07 ENCOUNTER — TRANSCRIPTION ENCOUNTER (OUTPATIENT)
Age: 60
End: 2020-04-07

## 2020-04-07 LAB
GLUCOSE BLDC GLUCOMTR-MCNC: 226 MG/DL — HIGH (ref 70–99)
GLUCOSE BLDC GLUCOMTR-MCNC: 235 MG/DL — HIGH (ref 70–99)
GLUCOSE BLDC GLUCOMTR-MCNC: 317 MG/DL — HIGH (ref 70–99)
GLUCOSE BLDC GLUCOMTR-MCNC: 346 MG/DL — HIGH (ref 70–99)

## 2020-04-07 PROCEDURE — 99232 SBSQ HOSP IP/OBS MODERATE 35: CPT

## 2020-04-07 RX ADMIN — ENOXAPARIN SODIUM 40 MILLIGRAM(S): 100 INJECTION SUBCUTANEOUS at 13:29

## 2020-04-07 RX ADMIN — Medication 2: at 09:05

## 2020-04-07 RX ADMIN — INSULIN GLARGINE 8 UNIT(S): 100 INJECTION, SOLUTION SUBCUTANEOUS at 21:30

## 2020-04-07 RX ADMIN — Medication 2: at 21:30

## 2020-04-07 RX ADMIN — SENNA PLUS 2 TABLET(S): 8.6 TABLET ORAL at 21:30

## 2020-04-07 RX ADMIN — Medication 4: at 18:02

## 2020-04-07 RX ADMIN — Medication 2: at 13:30

## 2020-04-07 RX ADMIN — INSULIN GLARGINE 8 UNIT(S): 100 INJECTION, SOLUTION SUBCUTANEOUS at 00:00

## 2020-04-07 NOTE — DISCHARGE NOTE PROVIDER - HOSPITAL COURSE
58 y/o Female. PMH of Schizophrenia (not on medications), DM and HTN (not on medications). Presents with shortness of breath, fevers, chills and a non-productive cough. On arrival to the ED patient found to be hypoxic to the low 80's on RA. Admitted for Acute Respiratory Failure with Hypoxia.          Acute respiratory failure with hypoxia.       Likely secondary to Viral URI, r/p COVID 19.     COVID 19 PCR Pending.    CXR Airspace opacities in the bilateral lower lobes and the lateral aspect of the left upper lobe. Perihilar opacities are also suspected. Cardiomediastinal silhouette is intact.    Albuterol PRN SOB/WHeezing    Tylenol PRN Fever    Maintain O2% >90%, currently on NC.     Hep C negative            Schizoaffective disorder.  Plan: Not on any medications    Mood stable.              HTN (hypertension).  Plan: Not on medications    Monitoring BP.             Diabetes.  Plan: ISS and Hypoglycemic protocol.         Hypokalemia.  Plan: S/p 1 dose of PO K    Recheck CMP in the AM.         Hyponatremia. Plan: Likely hypovolemic hyponatremia    Encouraging PO Intake.         DVT PPx SC Lovenox.     Dispo: home        On 4/7/2020, case was discussed with Dr. Griffin, patient is medically cleared and optimized for discharge today. All medications were reviewed with attending, and sent to mutually agreed upon pharmacy. 58 y/o Female. PMH of Schizophrenia (not on medications), DM and HTN (not on medications). Presents with shortness of breath, fevers, chills and a non-productive cough. On arrival to the ED patient found to be hypoxic to the low 80's on RA. Admitted for Acute Respiratory Failure with Hypoxia.         Pt was admitted to the hospital and was found to have COVID 19. Pt was managed supportively with oxygen supplementation. Pt was weaned off supplemental oxygen and was saturating well on room air prior to discharge.          Dispo:         On 4/7/2020, case was discussed with Dr. Griffin, patient is medically cleared and optimized for discharge today. All medications were reviewed with attending, and sent to mutually agreed upon pharmacy. 58 y/o Female. PMH of Schizophrenia (not on medications), DM and HTN (not on medications). Presents with shortness of breath, fevers, chills and a non-productive cough. On arrival to the ED patient found to be hypoxic to the low 80's on RA. Admitted for Acute Respiratory Failure with Hypoxia.         Pt was admitted to the hospital and was found to have COVID 19. Pt was managed supportively with oxygen supplementation. Pt was weaned off supplemental oxygen and was saturating well on room air prior to discharge.          Dispo: NH        On 4/8/2020, case was discussed with Dr. Griffin, patient is medically cleared and optimized for discharge today.     All medications were reviewed with attending, and sent to mutually agreed upon pharmacy.        58 y/o Female. PMH of Schizophrenia (not on medications), DM and HTN (not on medications). Presents with shortness of breath, fevers, chills and a non-productive cough. On arrival to the ED patient found to be hypoxic to the low 80's on RA. Admitted for Acute Respiratory Failure with Hypoxia due to COVID.          Problem/Plan - 1:    ·  Problem: Acute respiratory failure with hypoxia.  Plan: Due to COVID 19 infection     Weaned off O2, now sating 95 on RA w/ ambulation     CXR w/ airspace opacities in the bilateral lower lobes and the lateral aspect of the left upper lobe. Perihilar opacities are also suspected.     On Albuterol PRN for  SOB/Wheezing.          Problem/Plan - 2:    ·  Problem: SARS-associated coronavirus as cause of disease classified elsewhere.  Plan: COVID 19 PCR positive    Inflammatory markers elevated     Supportive care- Tylenol PRN Fever.          Problem/Plan - 3:    ·  Problem: Schizoaffective disorder.  Plan: May resume Haldol decanoate q4 weeks as outpatient     Mood stable.          Problem/Plan - 4:    ·  Problem: HTN (hypertension).  Plan: Not on medications    SBP within goal     Monitoring BP.          Problem/Plan - 5:    ·  Problem: Diabetes.  Plan: DM2 w/ hyperglycemia     Patient refusing insulin at times     Increase Lantus to 10units qhs    Insulin sliding scale     Monitor fingersticks    May resume PO meds upon discharge.          Problem/Plan - 6:    Problem: Hypokalemia. Plan: resolved- s/p Repletion     Monitor BMP.         Problem/Plan - 7:    ·  Problem: Hyponatremia.  Plan: Likely hypovolemic hyponatremia    Improving     Encouraging PO Intake.          Problem/Plan - 8:    ·  Problem: Prophylactic measure.  Plan: DVT PPx SC Lovenox    Dispo - DC home, d/c time 32 minutes.          Problem/Plan - 9:    ·  Problem: Discharge planning issues.  Plan: Medically cleared for discharge. Family refusing to take patient home, states patient is non-compliant with medications and difficult to deal with. Patient and family agree to SNF placement. 58 y/o Female PMH of Schizophrenia (not on medications), DM2 and HTN (not on medications), presents with shortness of breath, fevers, chills and a non-productive cough. On arrival to the ED patient found to be hypoxic to the low 80's on RA. Admitted for acute respiratory failure with hypoxia due to COVID-19 pneumonia. .     She was found to be COVID 19 positive. Pt was managed supportively with oxygen supplementation. Pt was weaned off supplemental oxygen and was saturating well on room air prior to discharge.     Hospital course complicated by uncontrolled DM2 w/ hyperglycemia. Patient is not compliant w/ medications. She was on insulin sliding scale and was started on Lantus, but she would refuse insulin at times. Plan to restart PO meds as outpatient. Family refused to take patient home d/t psych issues. She has no SI, it's not a harm to self. She also has no PT needs. She was seen by social work and set up transfer to NH.         On 4/8/2020, case was discussed with Dr. Griffin, patient is medically cleared and optimized for discharge today.     All medications were reviewed with attending, and sent to mutually agreed upon pharmacy.

## 2020-04-07 NOTE — DISCHARGE NOTE PROVIDER - NSDCFUADDINST_GEN_ALL_CORE_FT
Patient should remain in isolation for a total of 14 days from time of discharge. Patient was diagnosed on 4/3/2020 and would be expected to complete isolation on or after 4/17/2020.    Those caring for the patient should maintain strict hand hygiene and avoid touching face as much as possible. If any family members develop shortness of breath or fevers they should contact their primary care provider as soon as possible.

## 2020-04-07 NOTE — DISCHARGE NOTE PROVIDER - NSFOLLOWUPCLINICS_GEN_ALL_ED_FT
[facility] [place]  Internal Medicine  [address] [place] [address]  Phone: [phone]  Fax: [phone]  Follow Up Time:

## 2020-04-07 NOTE — PROGRESS NOTE ADULT - PROBLEM SELECTOR PLAN 5
DM2 w/ hyperglycemia   Patient refusing insulin as inpatient, may resume PO meds upon discharge   Insulin sliding scale   Monitor fingersticks

## 2020-04-07 NOTE — PROGRESS NOTE ADULT - SUBJECTIVE AND OBJECTIVE BOX
PROGRESS NOTE:     Patient is a 59y old  Female who presents with a chief complaint of Shortness of Breath (07 Apr 2020 12:04)      SUBJECTIVE / OVERNIGHT EVENTS: No new complaints.     ADDITIONAL REVIEW OF SYSTEMS:    MEDICATIONS  (STANDING):  dextrose 5%. 1000 milliLiter(s) (50 mL/Hr) IV Continuous <Continuous>  dextrose 50% Injectable 12.5 Gram(s) IV Push once  dextrose 50% Injectable 25 Gram(s) IV Push once  dextrose 50% Injectable 25 Gram(s) IV Push once  enoxaparin Injectable 40 milliGRAM(s) SubCutaneous daily  insulin glargine Injectable (LANTUS) 8 Unit(s) SubCutaneous at bedtime  insulin lispro (HumaLOG) corrective regimen sliding scale   SubCutaneous three times a day before meals  insulin lispro (HumaLOG) corrective regimen sliding scale   SubCutaneous at bedtime  senna 2 Tablet(s) Oral at bedtime    MEDICATIONS  (PRN):  acetaminophen   Tablet .. 650 milliGRAM(s) Oral every 6 hours PRN Temp greater or equal to 38C (100.4F), Mild Pain (1 - 3)  ALBUTerol    90 MICROgram(s) HFA Inhaler 2 Puff(s) Inhalation every 6 hours PRN Shortness of Breath and/or Wheezing  dextrose 40% Gel 15 Gram(s) Oral once PRN Blood Glucose LESS THAN 70 milliGRAM(s)/deciliter  glucagon  Injectable 1 milliGRAM(s) IntraMuscular once PRN Glucose LESS THAN 70 milligrams/deciliter  polyethylene glycol 3350 17 Gram(s) Oral daily PRN Constipation      CAPILLARY BLOOD GLUCOSE      POCT Blood Glucose.: 235 mg/dL (07 Apr 2020 12:24)  POCT Blood Glucose.: 226 mg/dL (07 Apr 2020 08:49)  POCT Blood Glucose.: 378 mg/dL (06 Apr 2020 22:05)  POCT Blood Glucose.: 305 mg/dL (06 Apr 2020 17:14)    I&O's Summary      PHYSICAL EXAM:  Vital Signs Last 24 Hrs  T(C): 36.5 (07 Apr 2020 11:54), Max: 36.7 (07 Apr 2020 02:00)  T(F): 97.7 (07 Apr 2020 11:54), Max: 98 (07 Apr 2020 02:00)  HR: 103 (07 Apr 2020 11:54) (86 - 103)  BP: 144/81 (07 Apr 2020 11:54) (122/61 - 144/81)  BP(mean): --  RR: 17 (07 Apr 2020 11:55) (16 - 18)  SpO2: 95% (07 Apr 2020 11:55) (94% - 98%)    CONSTITUTIONAL: NAD, well-developed  RESPIRATORY: Normal respiratory effort; lungs are clear to auscultation bilaterally  CARDIOVASCULAR: Tachycardia, normal S1 and S2, no murmur/rub/gallop; No lower extremity edema; Peripheral pulses are 2+ bilaterally  ABDOMEN: Nontender to palpation, normoactive bowel sounds, no rebound/guarding; No hepatosplenomegaly  MUSCLOSKELETAL: no clubbing or cyanosis of digits; no joint swelling or tenderness to palpation  PSYCH: A+O to person, place, and time; affect appropriate    LABS:                      RADIOLOGY & ADDITIONAL TESTS:  Results Reviewed:   Imaging Personally Reviewed:  Electrocardiogram Personally Reviewed:    COORDINATION OF CARE:  Care Discussed with Consultants/Other Providers [Y/N]:  Prior or Outpatient Records Reviewed [Y/N]:

## 2020-04-07 NOTE — PROGRESS NOTE ADULT - PROBLEM SELECTOR PLAN 1
Due to COVID 19 infection   Weaned off O2, now sating 95 on RA w/ ambulation   CXR w/ airspace opacities in the bilateral lower lobes and the lateral aspect of the left upper lobe. Perihilar opacities are also suspected.   On Albuterol PRN for  SOB/Wheezing

## 2020-04-07 NOTE — DISCHARGE NOTE PROVIDER - NSDCMRMEDTOKEN_GEN_ALL_CORE_FT
glimepiride 4 mg oral tablet: 1 tab(s) orally 2 times a day  haloperidol decanoate 100 mg/mL intramuscular solution: 1 milliliter(s) intramuscular every 4 weeks  metFORMIN 1000 mg oral tablet: 1 tab(s) orally 2 times a day (with meals) albuterol 90 mcg/inh inhalation aerosol: 2 puff(s) inhaled every 6 hours, As needed, Shortness of Breath and/or Wheezing  glimepiride 4 mg oral tablet: 1 tab(s) orally 2 times a day  haloperidol decanoate 100 mg/mL intramuscular solution: 1 milliliter(s) intramuscular every 4 weeks  metFORMIN 1000 mg oral tablet: 1 tab(s) orally 2 times a day (with meals)  senna oral tablet: 2 tab(s) orally once a day (at bedtime)

## 2020-04-07 NOTE — DISCHARGE NOTE PROVIDER - NSDCCPCAREPLAN_GEN_ALL_CORE_FT
PRINCIPAL DISCHARGE DIAGNOSIS  Diagnosis: Viral syndrome  Assessment and Plan of Treatment: You were found to be positive COVID 19 virus (diagnosed on 4/3/2020). Please follow full instructions listed in your paperwork. Please self quarantine at home for 14 days from discharge and stay 6 feet away minimum from other individuals or animals. Do not go to work, school, public areas, or use public transportaion. Restrict outside activity except for  medical care. Please call ahead if you have an appointment with your doctor to inform them of your COVID positive status. Always wear a facemask at home. Cover your sneezes and coughs, and wash hands with soap and water for at least 20 seconds frequently. Avoid sharing personal household items. Clean all surfaces where you contact frequently such as coutners and doorknobs frequently.  If you have any worsening breathing, faster breathing or trouble with breathing call 911 immediately or your healthcare provider ahead of time and wear facemask before being seen by medical personel.   Take tylenol for your fevers. Please follow state and local rules and regulations regarding coming off of isolation.        SECONDARY DISCHARGE DIAGNOSES  Diagnosis: Schizoaffective disorder  Assessment and Plan of Treatment: Continue home haldol as prescribed. Continue your medications as directed and follow up with your PCP and psychiatrist for further evaluation and medical management. If you are ever in need of immediate psychiatric assistance you may reach out to the [facility] [phone].    Diagnosis: Diabetes  Assessment and Plan of Treatment: Continue your home oral medication regimen and a consistent carbohydrate diet (Meaning eating the same amount of carbohydrates at the same time each day). Monitor blood glucose levels throughout the day before meals and at bedtime. Record blood sugars and bring to outpatient providers appointment in order to be reviewed by your doctor for management modifications. If your sugars are more than 400 or less than 70 you should contact your PCP immediately. Monitor for signs/symptoms of low blood glucose, such as, dizziness, altered mental status, or cool/clammy skin. In addition, monitor for signs/symptoms of high blood glucose, such as, feeling hot, dry, fatigued, or with increased thirst/urination. Make regular podiatry appointments in order to have feet checked for wounds and uncontrolled toe nail growth to prevent infections, as well as, appointments with an ophthalmologist to monitor your vision.      Diagnosis: Hyponatremia  Assessment and Plan of Treatment: Noted with low sodium levels, no improved. repeat BMP in 1-2 weeks PRINCIPAL DISCHARGE DIAGNOSIS  Diagnosis: Viral syndrome  Assessment and Plan of Treatment: You were found to be positive COVID 19 virus (diagnosed on 4/3/2020). Please follow full instructions listed in your paperwork. Please self quarantine at home for 14 days from discharge and stay 6 feet away minimum from other individuals or animals. Do not go to work, school, public areas, or use public transportaion. Restrict outside activity except for  medical care. Please call ahead if you have an appointment with your doctor to inform them of your COVID positive status. Always wear a facemask at home. Cover your sneezes and coughs, and wash hands with soap and water for at least 20 seconds frequently. Avoid sharing personal household items. Clean all surfaces where you contact frequently such as coutners and doorknobs frequently.  If you have any worsening breathing, faster breathing or trouble with breathing call 911 immediately or your healthcare provider ahead of time and wear facemask before being seen by medical personel.   Take tylenol for your fevers. Please follow state and local rules and regulations regarding coming off of isolation.  You have been diagnosed with the COVID-19 virus during your hospital stay. You must self quarantine to complete a 14 day time period.  Monitor for fevers, shortness of breath and cough primarily.  Monitor your temperature daily to not any changes and increases.          SECONDARY DISCHARGE DIAGNOSES  Diagnosis: Diabetes  Assessment and Plan of Treatment: Continue your home oral medication regimen and a consistent carbohydrate diet (Meaning eating the same amount of carbohydrates at the same time each day). Monitor blood glucose levels throughout the day before meals and at bedtime. Record blood sugars and bring to outpatient providers appointment in order to be reviewed by your doctor for management modifications. If your sugars are more than 400 or less than 70 you should contact your PCP immediately. Monitor for signs/symptoms of low blood glucose, such as, dizziness, altered mental status, or cool/clammy skin. In addition, monitor for signs/symptoms of high blood glucose, such as, feeling hot, dry, fatigued, or with increased thirst/urination. Make regular podiatry appointments in order to have feet checked for wounds and uncontrolled toe nail growth to prevent infections, as well as, appointments with an ophthalmologist to monitor your vision.      Diagnosis: Hyponatremia  Assessment and Plan of Treatment: Noted with low sodium levels, no improved. repeat BMP in 1-2 weeks    Diagnosis: Schizoaffective disorder  Assessment and Plan of Treatment: Continue home haldol as prescribed. Continue your medications as directed and follow up with your PCP and psychiatrist for further evaluation and medical management. If you are ever in need of immediate psychiatric assistance you may reach out to the [facility] [phone].    Diagnosis: HTN (hypertension)  Assessment and Plan of Treatment: HTN (hypertension) Low sodium and fat diet, Continue diet control. Monitor for any visual changes, headaches or dizziness.  Monitor blood pressure regularly.  Follow up with your primary care provider for further management for high blood pressure. PRINCIPAL DISCHARGE DIAGNOSIS  Diagnosis: Viral syndrome  Assessment and Plan of Treatment: You were found to be positive COVID 19 virus (diagnosed on 4/3/2020). Please follow full instructions listed in your paperwork. Please self quarantine at home for 14 days from discharge and stay 6 feet away minimum from other individuals or animals. Do not go to work, school, public areas, or use public transportaion. Restrict outside activity except for  medical care. Please call ahead if you have an appointment with your doctor to inform them of your COVID positive status. Always wear a facemask at home. Cover your sneezes and coughs, and wash hands with soap and water for at least 20 seconds frequently. Avoid sharing personal household items. Clean all surfaces where you contact frequently such as coutners and doorknobs frequently.  If you have any worsening breathing, faster breathing or trouble with breathing call 911 immediately or your healthcare provider ahead of time and wear facemask before being seen by medical personel.   Take tylenol for your fevers. Please follow state and local rules and regulations regarding coming off of isolation.  You have been diagnosed with the COVID-19 virus during your hospital stay. You must self quarantine to complete a 14 day time period.  Monitor for fevers, shortness of breath and cough primarily.  Monitor your temperature daily to not any changes and increases.          SECONDARY DISCHARGE DIAGNOSES  Diagnosis: Diabetes  Assessment and Plan of Treatment: Continue your home oral medication regimen and a consistent carbohydrate diet (Meaning eating the same amount of carbohydrates at the same time each day). Monitor blood glucose levels throughout the day before meals and at bedtime. Record blood sugars and bring to outpatient providers appointment in order to be reviewed by your doctor for management modifications. If your sugars are more than 400 or less than 70 you should contact your PCP immediately. Monitor for signs/symptoms of low blood glucose, such as, dizziness, altered mental status, or cool/clammy skin. In addition, monitor for signs/symptoms of high blood glucose, such as, feeling hot, dry, fatigued, or with increased thirst/urination. Make regular podiatry appointments in order to have feet checked for wounds and uncontrolled toe nail growth to prevent infections, as well as, appointments with an ophthalmologist to monitor your vision.      Diagnosis: Hyponatremia  Assessment and Plan of Treatment: Noted with low sodium levels, no improved. repeat BMP in 1-2 weeks    Diagnosis: Schizoaffective disorder  Assessment and Plan of Treatment: Continue home haldol as prescribed. Continue your medications as directed and follow up with your PCP and psychiatrist for further evaluation and medical management. If you are ever in need of immediate psychiatric assistance you may reach out to the [facility] [phone].    Diagnosis: HTN (hypertension)  Assessment and Plan of Treatment: HTN (hypertension) Low sodium and fat diet, Continue diet control. Monitor for any visual changes, headaches or dizziness.  Monitor blood pressure regularly.  Follow up with your primary care provider for further management for high blood pressure.

## 2020-04-08 VITALS
DIASTOLIC BLOOD PRESSURE: 85 MMHG | RESPIRATION RATE: 18 BRPM | OXYGEN SATURATION: 100 % | SYSTOLIC BLOOD PRESSURE: 133 MMHG | TEMPERATURE: 98 F | HEART RATE: 103 BPM

## 2020-04-08 LAB
GLUCOSE BLDC GLUCOMTR-MCNC: 244 MG/DL — HIGH (ref 70–99)
GLUCOSE BLDC GLUCOMTR-MCNC: 272 MG/DL — HIGH (ref 70–99)
GLUCOSE BLDC GLUCOMTR-MCNC: 291 MG/DL — HIGH (ref 70–99)
GLUCOSE BLDC GLUCOMTR-MCNC: 318 MG/DL — HIGH (ref 70–99)

## 2020-04-08 PROCEDURE — 99232 SBSQ HOSP IP/OBS MODERATE 35: CPT

## 2020-04-08 RX ORDER — SENNA PLUS 8.6 MG/1
2 TABLET ORAL
Qty: 0 | Refills: 0 | DISCHARGE
Start: 2020-04-08

## 2020-04-08 RX ORDER — ALBUTEROL 90 UG/1
2 AEROSOL, METERED ORAL
Qty: 0 | Refills: 0 | DISCHARGE
Start: 2020-04-08

## 2020-04-08 RX ORDER — INSULIN GLARGINE 100 [IU]/ML
10 INJECTION, SOLUTION SUBCUTANEOUS AT BEDTIME
Refills: 0 | Status: DISCONTINUED | OUTPATIENT
Start: 2020-04-08 | End: 2020-04-09

## 2020-04-08 RX ADMIN — Medication 3: at 13:06

## 2020-04-08 RX ADMIN — Medication 4: at 18:24

## 2020-04-08 RX ADMIN — ENOXAPARIN SODIUM 40 MILLIGRAM(S): 100 INJECTION SUBCUTANEOUS at 13:06

## 2020-04-08 RX ADMIN — INSULIN GLARGINE 10 UNIT(S): 100 INJECTION, SOLUTION SUBCUTANEOUS at 22:34

## 2020-04-08 RX ADMIN — Medication 3: at 09:51

## 2020-04-08 RX ADMIN — SENNA PLUS 2 TABLET(S): 8.6 TABLET ORAL at 22:35

## 2020-04-08 NOTE — PROVIDER CONTACT NOTE (MEDICATION) - ASSESSMENT
Patient A&Ox2-3, VSS. Patient refusing lab draws. Patient states she does " not want to be stuck anymore." Patient educated on importance of daily blood draws while being in the hospital. Patient continues to refuse.

## 2020-04-08 NOTE — PROGRESS NOTE ADULT - SUBJECTIVE AND OBJECTIVE BOX
PROGRESS NOTE:     Patient is a 59y old  Female who presents with a chief complaint of Shortness of Breath (07 Apr 2020 12:42)      SUBJECTIVE / OVERNIGHT EVENTS: No new complaints.     ADDITIONAL REVIEW OF SYSTEMS:    MEDICATIONS  (STANDING):  dextrose 5%. 1000 milliLiter(s) (50 mL/Hr) IV Continuous <Continuous>  dextrose 50% Injectable 12.5 Gram(s) IV Push once  dextrose 50% Injectable 25 Gram(s) IV Push once  dextrose 50% Injectable 25 Gram(s) IV Push once  enoxaparin Injectable 40 milliGRAM(s) SubCutaneous daily  insulin glargine Injectable (LANTUS) 10 Unit(s) SubCutaneous at bedtime  insulin lispro (HumaLOG) corrective regimen sliding scale   SubCutaneous three times a day before meals  insulin lispro (HumaLOG) corrective regimen sliding scale   SubCutaneous at bedtime  senna 2 Tablet(s) Oral at bedtime    MEDICATIONS  (PRN):  acetaminophen   Tablet .. 650 milliGRAM(s) Oral every 6 hours PRN Temp greater or equal to 38C (100.4F), Mild Pain (1 - 3)  ALBUTerol    90 MICROgram(s) HFA Inhaler 2 Puff(s) Inhalation every 6 hours PRN Shortness of Breath and/or Wheezing  dextrose 40% Gel 15 Gram(s) Oral once PRN Blood Glucose LESS THAN 70 milliGRAM(s)/deciliter  glucagon  Injectable 1 milliGRAM(s) IntraMuscular once PRN Glucose LESS THAN 70 milligrams/deciliter  polyethylene glycol 3350 17 Gram(s) Oral daily PRN Constipation      CAPILLARY BLOOD GLUCOSE      POCT Blood Glucose.: 272 mg/dL (08 Apr 2020 09:07)  POCT Blood Glucose.: 317 mg/dL (07 Apr 2020 21:17)  POCT Blood Glucose.: 346 mg/dL (07 Apr 2020 17:16)  POCT Blood Glucose.: 235 mg/dL (07 Apr 2020 12:24)    I&O's Summary    07 Apr 2020 07:01  -  08 Apr 2020 07:00  --------------------------------------------------------  IN: 400 mL / OUT: 0 mL / NET: 400 mL        PHYSICAL EXAM:  Vital Signs Last 24 Hrs  T(C): 36.8 (07 Apr 2020 23:46), Max: 36.8 (07 Apr 2020 23:46)  T(F): 98.2 (07 Apr 2020 23:46), Max: 98.2 (07 Apr 2020 23:46)  HR: 95 (07 Apr 2020 23:46) (95 - 103)  BP: 135/75 (07 Apr 2020 23:46) (135/75 - 144/81)  BP(mean): --  RR: 18 (07 Apr 2020 23:46) (17 - 18)  SpO2: 95% (07 Apr 2020 23:46) (95% - 98%)    CONSTITUTIONAL: NAD, well-developed  RESPIRATORY: Normal respiratory effort; lungs are clear to auscultation bilaterally  CARDIOVASCULAR: Tachycardia, normal S1 and S2, no murmur/rub/gallop; No lower extremity edema; Peripheral pulses are 2+ bilaterally  ABDOMEN: Nontender to palpation, normoactive bowel sounds, no rebound/guarding; No hepatosplenomegaly  MUSCLOSKELETAL: no clubbing or cyanosis of digits; no joint swelling or tenderness to palpation  PSYCH: A+O to person, place, and time; affect appropriate    LABS:                      RADIOLOGY & ADDITIONAL TESTS:  Results Reviewed:   Imaging Personally Reviewed:  Electrocardiogram Personally Reviewed:    COORDINATION OF CARE:  Care Discussed with Consultants/Other Providers [Y/N]:  Prior or Outpatient Records Reviewed [Y/N]:

## 2020-04-08 NOTE — PROGRESS NOTE ADULT - PROBLEM SELECTOR PLAN 5
DM2 w/ hyperglycemia   Patient refusing insulin at times   Increase Lantus to 10units qhs  Insulin sliding scale   Monitor fingersticks  May resume PO meds upon discharge

## 2020-04-08 NOTE — PROGRESS NOTE ADULT - ASSESSMENT
58 y/o Female. PMH of Schizophrenia (not on medications), DM and HTN (not on medications). Presents with shortness of breath, fevers, chills and a non-productive cough. On arrival to the ED patient found to be hypoxic to the low 80's on RA. Admitted for Acute Respiratory Failure with Hypoxia due to COVID.

## 2020-04-08 NOTE — PHYSICAL THERAPY INITIAL EVALUATION ADULT - PERTINENT HX OF CURRENT PROBLEM, REHAB EVAL
60 y/o Female. PMH of Schizophrenia (not on medications), DM and HTN (not on medications). Presents with a complain of shortness of breath. Patient states that for the past 2 weeks she has been expiriencing fevers, chills and a non-productive cough and has also developed shortness of breath.

## 2020-04-08 NOTE — PROGRESS NOTE ADULT - PROBLEM SELECTOR PLAN 9
Medically cleared for discharge. Family refusing to take patient home, states patient is non-compliant with medications and difficult to deal with. Patient and family agree to SNF placement.

## 2020-04-08 NOTE — PHYSICAL THERAPY INITIAL EVALUATION ADULT - ADDITIONAL COMMENTS
Pt presents to hospital from [facility]. Pt lives with mother and sister. Pt states she was independent in all ADL prior to admission. Pt denies using assistive device prior to admission.

## 2020-04-09 ENCOUNTER — TRANSCRIPTION ENCOUNTER (OUTPATIENT)
Age: 60
End: 2020-04-09

## 2020-04-09 PROCEDURE — 99239 HOSP IP/OBS DSCHRG MGMT >30: CPT

## 2020-04-09 NOTE — PROGRESS NOTE ADULT - REASON FOR ADMISSION
Shortness of Breath

## 2020-04-09 NOTE — PROGRESS NOTE ADULT - SUBJECTIVE AND OBJECTIVE BOX
PROGRESS NOTE:     Patient is a 59y old  Female who presents with a chief complaint of Shortness of Breath (08 Apr 2020 10:23)      SUBJECTIVE / OVERNIGHT EVENTS: Pt wants to go home. Has no new complaints.     ADDITIONAL REVIEW OF SYSTEMS:    MEDICATIONS  (STANDING):  dextrose 5%. 1000 milliLiter(s) (50 mL/Hr) IV Continuous <Continuous>  dextrose 50% Injectable 12.5 Gram(s) IV Push once  dextrose 50% Injectable 25 Gram(s) IV Push once  dextrose 50% Injectable 25 Gram(s) IV Push once  enoxaparin Injectable 40 milliGRAM(s) SubCutaneous daily  insulin glargine Injectable (LANTUS) 10 Unit(s) SubCutaneous at bedtime  insulin lispro (HumaLOG) corrective regimen sliding scale   SubCutaneous three times a day before meals  insulin lispro (HumaLOG) corrective regimen sliding scale   SubCutaneous at bedtime  senna 2 Tablet(s) Oral at bedtime    MEDICATIONS  (PRN):  acetaminophen   Tablet .. 650 milliGRAM(s) Oral every 6 hours PRN Temp greater or equal to 38C (100.4F), Mild Pain (1 - 3)  ALBUTerol    90 MICROgram(s) HFA Inhaler 2 Puff(s) Inhalation every 6 hours PRN Shortness of Breath and/or Wheezing  dextrose 40% Gel 15 Gram(s) Oral once PRN Blood Glucose LESS THAN 70 milliGRAM(s)/deciliter  glucagon  Injectable 1 milliGRAM(s) IntraMuscular once PRN Glucose LESS THAN 70 milligrams/deciliter  polyethylene glycol 3350 17 Gram(s) Oral daily PRN Constipation      CAPILLARY BLOOD GLUCOSE      POCT Blood Glucose.: 244 mg/dL (08 Apr 2020 21:50)  POCT Blood Glucose.: 318 mg/dL (08 Apr 2020 17:25)    I&O's Summary      PHYSICAL EXAM:  Has been refusing vitals       CONSTITUTIONAL: NAD, well-developed  RESPIRATORY: Normal respiratory effort; lungs are clear to auscultation bilaterally  CARDIOVASCULAR: Tachycardia, normal S1 and S2, no murmur/rub/gallop; No lower extremity edema; Peripheral pulses are 2+ bilaterally  ABDOMEN: Nontender to palpation, normoactive bowel sounds, no rebound/guarding; No hepatosplenomegaly  MUSCLOSKELETAL: no clubbing or cyanosis of digits; no joint swelling or tenderness to palpation  PSYCH: A+O to person, place, and time; affect appropriate. +Tremors.     LABS:                      RADIOLOGY & ADDITIONAL TESTS:  Results Reviewed:   Imaging Personally Reviewed:  Electrocardiogram Personally Reviewed:    COORDINATION OF CARE:  Care Discussed with Consultants/Other Providers [Y/N]:  Prior or Outpatient Records Reviewed [Y/N]:

## 2020-04-09 NOTE — DISCHARGE NOTE NURSING/CASE MANAGEMENT/SOCIAL WORK - PATIENT PORTAL LINK FT
You can access the FollowMyHealth Patient Portal offered by [facility] by registering at the following website: [email or web address]. By joining [employer]’s FollowMyHealth portal, you will also be able to view your health information using other applications (apps) compatible with our system.

## 2020-04-17 ENCOUNTER — EMERGENCY (EMERGENCY)
Facility: HOSPITAL | Age: 60
LOS: 1 days | Discharge: LEFT BEFORE DISCHARGE | End: 2020-04-17
Admitting: EMERGENCY MEDICINE
Payer: MEDICAID

## 2020-04-17 PROBLEM — I10 ESSENTIAL (PRIMARY) HYPERTENSION: Chronic | Status: ACTIVE | Noted: 2020-04-03

## 2020-04-17 PROCEDURE — L9991: CPT

## 2020-04-17 NOTE — ED CLERICAL - NS ED CLERK NOTE PRE-ARRIVAL INFORMATION; ADDITIONAL PRE-ARRIVAL INFORMATION
This patient is enrolled in the COVID19 Transitional Care Management program and has active care navigation. This patient can be followed up by the care navigation team within 24 hours. To arrange close follow-up or to obtain additional clinical information about this patient, please call the contact number above.

## 2020-04-20 DIAGNOSIS — U07.1 COVID-19: ICD-10-CM

## 2020-04-20 DIAGNOSIS — J12.89 COVID-19: ICD-10-CM

## 2020-04-20 RX ORDER — ACETAMINOPHEN 500 MG/1
500 TABLET ORAL EVERY 8 HOURS
Qty: 180 | Refills: 0 | Status: ACTIVE | COMMUNITY
Start: 2020-04-20 | End: 1900-01-01

## 2020-04-20 RX ORDER — GUAIFENESIN 100 MG/5ML
100 LIQUID ORAL EVERY 6 HOURS
Qty: 1 | Refills: 0 | Status: ACTIVE | COMMUNITY
Start: 2020-04-20 | End: 1900-01-01

## 2020-05-14 PROBLEM — U07.1 PNEUMONIA DUE TO 2019 NOVEL CORONAVIRUS: Status: ACTIVE | Noted: 2020-04-20

## 2020-07-08 ENCOUNTER — TRANSCRIPTION ENCOUNTER (OUTPATIENT)
Age: 60
End: 2020-07-08

## 2020-07-09 ENCOUNTER — TRANSCRIPTION ENCOUNTER (OUTPATIENT)
Age: 60
End: 2020-07-09

## 2023-04-06 NOTE — PROGRESS NOTE ADULT - ASSESSMENT
60 y/o Female. PMH of Schizophrenia (not on medications), DM and HTN (not on medications). Presents with shortness of breath, fevers, chills and a non-productive cough. On arrival to the ED patient found to be hypoxic to the low 80's on RA. Admitted for Acute Respiratory Failure with Hypoxia due to COVID. Malnutrition...

## 2023-10-27 ENCOUNTER — EMERGENCY (EMERGENCY)
Facility: HOSPITAL | Age: 63
LOS: 1 days | Discharge: HOME | End: 2023-10-27
Attending: EMERGENCY MEDICINE | Admitting: EMERGENCY MEDICINE
Payer: MEDICAID

## 2023-10-27 VITALS
RESPIRATION RATE: 18 BRPM | HEART RATE: 103 BPM | TEMPERATURE: 98 F | SYSTOLIC BLOOD PRESSURE: 136 MMHG | DIASTOLIC BLOOD PRESSURE: 92 MMHG | OXYGEN SATURATION: 99 %

## 2023-10-27 VITALS
SYSTOLIC BLOOD PRESSURE: 131 MMHG | OXYGEN SATURATION: 100 % | DIASTOLIC BLOOD PRESSURE: 84 MMHG | RESPIRATION RATE: 16 BRPM | HEART RATE: 89 BPM | TEMPERATURE: 98 F

## 2023-10-27 LAB
A1C WITH ESTIMATED AVERAGE GLUCOSE RESULT: 11.3 % — HIGH (ref 4–5.6)
A1C WITH ESTIMATED AVERAGE GLUCOSE RESULT: 11.3 % — HIGH (ref 4–5.6)
ALBUMIN SERPL ELPH-MCNC: 4.6 G/DL — SIGNIFICANT CHANGE UP (ref 3.3–5)
ALBUMIN SERPL ELPH-MCNC: 4.6 G/DL — SIGNIFICANT CHANGE UP (ref 3.3–5)
ALP SERPL-CCNC: 79 U/L — SIGNIFICANT CHANGE UP (ref 40–120)
ALP SERPL-CCNC: 79 U/L — SIGNIFICANT CHANGE UP (ref 40–120)
ALT FLD-CCNC: 17 U/L — SIGNIFICANT CHANGE UP (ref 4–33)
ALT FLD-CCNC: 17 U/L — SIGNIFICANT CHANGE UP (ref 4–33)
ANION GAP SERPL CALC-SCNC: 11 MMOL/L — SIGNIFICANT CHANGE UP (ref 7–14)
ANION GAP SERPL CALC-SCNC: 11 MMOL/L — SIGNIFICANT CHANGE UP (ref 7–14)
ANISOCYTOSIS BLD QL: SLIGHT — SIGNIFICANT CHANGE UP
ANISOCYTOSIS BLD QL: SLIGHT — SIGNIFICANT CHANGE UP
APPEARANCE UR: CLEAR — SIGNIFICANT CHANGE UP
APPEARANCE UR: CLEAR — SIGNIFICANT CHANGE UP
AST SERPL-CCNC: 14 U/L — SIGNIFICANT CHANGE UP (ref 4–32)
AST SERPL-CCNC: 14 U/L — SIGNIFICANT CHANGE UP (ref 4–32)
B-OH-BUTYR SERPL-SCNC: <0 MMOL/L — SIGNIFICANT CHANGE UP (ref 0–0.4)
B-OH-BUTYR SERPL-SCNC: <0 MMOL/L — SIGNIFICANT CHANGE UP (ref 0–0.4)
BACTERIA # UR AUTO: NEGATIVE /HPF — SIGNIFICANT CHANGE UP
BACTERIA # UR AUTO: NEGATIVE /HPF — SIGNIFICANT CHANGE UP
BASE EXCESS BLDV CALC-SCNC: 1 MMOL/L — SIGNIFICANT CHANGE UP (ref -2–3)
BASE EXCESS BLDV CALC-SCNC: 1 MMOL/L — SIGNIFICANT CHANGE UP (ref -2–3)
BASOPHILS # BLD AUTO: 0 K/UL — SIGNIFICANT CHANGE UP (ref 0–0.2)
BASOPHILS # BLD AUTO: 0 K/UL — SIGNIFICANT CHANGE UP (ref 0–0.2)
BASOPHILS NFR BLD AUTO: 0 % — SIGNIFICANT CHANGE UP (ref 0–2)
BASOPHILS NFR BLD AUTO: 0 % — SIGNIFICANT CHANGE UP (ref 0–2)
BILIRUB SERPL-MCNC: 0.6 MG/DL — SIGNIFICANT CHANGE UP (ref 0.2–1.2)
BILIRUB SERPL-MCNC: 0.6 MG/DL — SIGNIFICANT CHANGE UP (ref 0.2–1.2)
BILIRUB UR-MCNC: NEGATIVE — SIGNIFICANT CHANGE UP
BILIRUB UR-MCNC: NEGATIVE — SIGNIFICANT CHANGE UP
BUN SERPL-MCNC: 6 MG/DL — LOW (ref 7–23)
BUN SERPL-MCNC: 6 MG/DL — LOW (ref 7–23)
CA-I SERPL-SCNC: 1.22 MMOL/L — SIGNIFICANT CHANGE UP (ref 1.15–1.33)
CA-I SERPL-SCNC: 1.22 MMOL/L — SIGNIFICANT CHANGE UP (ref 1.15–1.33)
CALCIUM SERPL-MCNC: 9.7 MG/DL — SIGNIFICANT CHANGE UP (ref 8.4–10.5)
CALCIUM SERPL-MCNC: 9.7 MG/DL — SIGNIFICANT CHANGE UP (ref 8.4–10.5)
CAST: 0 /LPF — SIGNIFICANT CHANGE UP (ref 0–4)
CAST: 0 /LPF — SIGNIFICANT CHANGE UP (ref 0–4)
CHLORIDE BLDV-SCNC: 96 MMOL/L — SIGNIFICANT CHANGE UP (ref 96–108)
CHLORIDE BLDV-SCNC: 96 MMOL/L — SIGNIFICANT CHANGE UP (ref 96–108)
CHLORIDE SERPL-SCNC: 95 MMOL/L — LOW (ref 98–107)
CHLORIDE SERPL-SCNC: 95 MMOL/L — LOW (ref 98–107)
CO2 BLDV-SCNC: 27.3 MMOL/L — HIGH (ref 22–26)
CO2 BLDV-SCNC: 27.3 MMOL/L — HIGH (ref 22–26)
CO2 SERPL-SCNC: 24 MMOL/L — SIGNIFICANT CHANGE UP (ref 22–31)
CO2 SERPL-SCNC: 24 MMOL/L — SIGNIFICANT CHANGE UP (ref 22–31)
COLOR SPEC: YELLOW — SIGNIFICANT CHANGE UP
COLOR SPEC: YELLOW — SIGNIFICANT CHANGE UP
CREAT SERPL-MCNC: 0.46 MG/DL — LOW (ref 0.5–1.3)
CREAT SERPL-MCNC: 0.46 MG/DL — LOW (ref 0.5–1.3)
DIFF PNL FLD: NEGATIVE — SIGNIFICANT CHANGE UP
DIFF PNL FLD: NEGATIVE — SIGNIFICANT CHANGE UP
EGFR: 107 ML/MIN/1.73M2 — SIGNIFICANT CHANGE UP
EGFR: 107 ML/MIN/1.73M2 — SIGNIFICANT CHANGE UP
EOSINOPHIL # BLD AUTO: 0.15 K/UL — SIGNIFICANT CHANGE UP (ref 0–0.5)
EOSINOPHIL # BLD AUTO: 0.15 K/UL — SIGNIFICANT CHANGE UP (ref 0–0.5)
EOSINOPHIL NFR BLD AUTO: 1.8 % — SIGNIFICANT CHANGE UP (ref 0–6)
EOSINOPHIL NFR BLD AUTO: 1.8 % — SIGNIFICANT CHANGE UP (ref 0–6)
ESTIMATED AVERAGE GLUCOSE: 278 — SIGNIFICANT CHANGE UP
ESTIMATED AVERAGE GLUCOSE: 278 — SIGNIFICANT CHANGE UP
FLUAV AG NPH QL: SIGNIFICANT CHANGE UP
FLUAV AG NPH QL: SIGNIFICANT CHANGE UP
FLUBV AG NPH QL: SIGNIFICANT CHANGE UP
FLUBV AG NPH QL: SIGNIFICANT CHANGE UP
GAS PNL BLDV: 129 MMOL/L — LOW (ref 136–145)
GAS PNL BLDV: 129 MMOL/L — LOW (ref 136–145)
GAS PNL BLDV: SIGNIFICANT CHANGE UP
GAS PNL BLDV: SIGNIFICANT CHANGE UP
GIANT PLATELETS BLD QL SMEAR: PRESENT — SIGNIFICANT CHANGE UP
GIANT PLATELETS BLD QL SMEAR: PRESENT — SIGNIFICANT CHANGE UP
GLUCOSE BLDV-MCNC: 320 MG/DL — HIGH (ref 70–99)
GLUCOSE BLDV-MCNC: 320 MG/DL — HIGH (ref 70–99)
GLUCOSE SERPL-MCNC: 311 MG/DL — HIGH (ref 70–99)
GLUCOSE SERPL-MCNC: 311 MG/DL — HIGH (ref 70–99)
GLUCOSE UR QL: >=1000 MG/DL
GLUCOSE UR QL: >=1000 MG/DL
HCO3 BLDV-SCNC: 26 MMOL/L — SIGNIFICANT CHANGE UP (ref 22–29)
HCO3 BLDV-SCNC: 26 MMOL/L — SIGNIFICANT CHANGE UP (ref 22–29)
HCT VFR BLD CALC: 38.2 % — SIGNIFICANT CHANGE UP (ref 34.5–45)
HCT VFR BLD CALC: 38.2 % — SIGNIFICANT CHANGE UP (ref 34.5–45)
HCT VFR BLDA CALC: 41 % — SIGNIFICANT CHANGE UP (ref 34.5–46.5)
HCT VFR BLDA CALC: 41 % — SIGNIFICANT CHANGE UP (ref 34.5–46.5)
HGB BLD CALC-MCNC: 13.5 G/DL — SIGNIFICANT CHANGE UP (ref 11.7–16.1)
HGB BLD CALC-MCNC: 13.5 G/DL — SIGNIFICANT CHANGE UP (ref 11.7–16.1)
HGB BLD-MCNC: 13.6 G/DL — SIGNIFICANT CHANGE UP (ref 11.5–15.5)
HGB BLD-MCNC: 13.6 G/DL — SIGNIFICANT CHANGE UP (ref 11.5–15.5)
IANC: 4.15 K/UL — SIGNIFICANT CHANGE UP (ref 1.8–7.4)
IANC: 4.15 K/UL — SIGNIFICANT CHANGE UP (ref 1.8–7.4)
KETONES UR-MCNC: NEGATIVE MG/DL — SIGNIFICANT CHANGE UP
KETONES UR-MCNC: NEGATIVE MG/DL — SIGNIFICANT CHANGE UP
LACTATE BLDV-MCNC: 2.9 MMOL/L — HIGH (ref 0.5–2)
LACTATE BLDV-MCNC: 2.9 MMOL/L — HIGH (ref 0.5–2)
LEUKOCYTE ESTERASE UR-ACNC: NEGATIVE — SIGNIFICANT CHANGE UP
LEUKOCYTE ESTERASE UR-ACNC: NEGATIVE — SIGNIFICANT CHANGE UP
LYMPHOCYTES # BLD AUTO: 2.28 K/UL — SIGNIFICANT CHANGE UP (ref 1–3.3)
LYMPHOCYTES # BLD AUTO: 2.28 K/UL — SIGNIFICANT CHANGE UP (ref 1–3.3)
LYMPHOCYTES # BLD AUTO: 27 % — SIGNIFICANT CHANGE UP (ref 13–44)
LYMPHOCYTES # BLD AUTO: 27 % — SIGNIFICANT CHANGE UP (ref 13–44)
MANUAL SMEAR VERIFICATION: SIGNIFICANT CHANGE UP
MANUAL SMEAR VERIFICATION: SIGNIFICANT CHANGE UP
MCHC RBC-ENTMCNC: 25 PG — LOW (ref 27–34)
MCHC RBC-ENTMCNC: 25 PG — LOW (ref 27–34)
MCHC RBC-ENTMCNC: 35.6 GM/DL — SIGNIFICANT CHANGE UP (ref 32–36)
MCHC RBC-ENTMCNC: 35.6 GM/DL — SIGNIFICANT CHANGE UP (ref 32–36)
MCV RBC AUTO: 70.3 FL — LOW (ref 80–100)
MCV RBC AUTO: 70.3 FL — LOW (ref 80–100)
MICROCYTES BLD QL: SLIGHT — SIGNIFICANT CHANGE UP
MICROCYTES BLD QL: SLIGHT — SIGNIFICANT CHANGE UP
MONOCYTES # BLD AUTO: 0.53 K/UL — SIGNIFICANT CHANGE UP (ref 0–0.9)
MONOCYTES # BLD AUTO: 0.53 K/UL — SIGNIFICANT CHANGE UP (ref 0–0.9)
MONOCYTES NFR BLD AUTO: 6.3 % — SIGNIFICANT CHANGE UP (ref 2–14)
MONOCYTES NFR BLD AUTO: 6.3 % — SIGNIFICANT CHANGE UP (ref 2–14)
NEUTROPHILS # BLD AUTO: 3.57 K/UL — SIGNIFICANT CHANGE UP (ref 1.8–7.4)
NEUTROPHILS # BLD AUTO: 3.57 K/UL — SIGNIFICANT CHANGE UP (ref 1.8–7.4)
NEUTROPHILS NFR BLD AUTO: 42.4 % — LOW (ref 43–77)
NEUTROPHILS NFR BLD AUTO: 42.4 % — LOW (ref 43–77)
NITRITE UR-MCNC: NEGATIVE — SIGNIFICANT CHANGE UP
NITRITE UR-MCNC: NEGATIVE — SIGNIFICANT CHANGE UP
PCO2 BLDV: 42 MMHG — SIGNIFICANT CHANGE UP (ref 39–52)
PCO2 BLDV: 42 MMHG — SIGNIFICANT CHANGE UP (ref 39–52)
PH BLDV: 7.4 — SIGNIFICANT CHANGE UP (ref 7.32–7.43)
PH BLDV: 7.4 — SIGNIFICANT CHANGE UP (ref 7.32–7.43)
PH UR: 6.5 — SIGNIFICANT CHANGE UP (ref 5–8)
PH UR: 6.5 — SIGNIFICANT CHANGE UP (ref 5–8)
PLAT MORPH BLD: NORMAL — SIGNIFICANT CHANGE UP
PLAT MORPH BLD: NORMAL — SIGNIFICANT CHANGE UP
PLATELET # BLD AUTO: 337 K/UL — SIGNIFICANT CHANGE UP (ref 150–400)
PLATELET # BLD AUTO: 337 K/UL — SIGNIFICANT CHANGE UP (ref 150–400)
PLATELET COUNT - ESTIMATE: NORMAL — SIGNIFICANT CHANGE UP
PLATELET COUNT - ESTIMATE: NORMAL — SIGNIFICANT CHANGE UP
PO2 BLDV: 60 MMHG — HIGH (ref 25–45)
PO2 BLDV: 60 MMHG — HIGH (ref 25–45)
POLYCHROMASIA BLD QL SMEAR: SLIGHT — SIGNIFICANT CHANGE UP
POLYCHROMASIA BLD QL SMEAR: SLIGHT — SIGNIFICANT CHANGE UP
POTASSIUM BLDV-SCNC: 4.7 MMOL/L — SIGNIFICANT CHANGE UP (ref 3.5–5.1)
POTASSIUM BLDV-SCNC: 4.7 MMOL/L — SIGNIFICANT CHANGE UP (ref 3.5–5.1)
POTASSIUM SERPL-MCNC: 4.2 MMOL/L — SIGNIFICANT CHANGE UP (ref 3.5–5.3)
POTASSIUM SERPL-MCNC: 4.2 MMOL/L — SIGNIFICANT CHANGE UP (ref 3.5–5.3)
POTASSIUM SERPL-SCNC: 4.2 MMOL/L — SIGNIFICANT CHANGE UP (ref 3.5–5.3)
POTASSIUM SERPL-SCNC: 4.2 MMOL/L — SIGNIFICANT CHANGE UP (ref 3.5–5.3)
PROT SERPL-MCNC: 7 G/DL — SIGNIFICANT CHANGE UP (ref 6–8.3)
PROT SERPL-MCNC: 7 G/DL — SIGNIFICANT CHANGE UP (ref 6–8.3)
PROT UR-MCNC: NEGATIVE MG/DL — SIGNIFICANT CHANGE UP
PROT UR-MCNC: NEGATIVE MG/DL — SIGNIFICANT CHANGE UP
RBC # BLD: 5.43 M/UL — HIGH (ref 3.8–5.2)
RBC # BLD: 5.43 M/UL — HIGH (ref 3.8–5.2)
RBC # FLD: 13.2 % — SIGNIFICANT CHANGE UP (ref 10.3–14.5)
RBC # FLD: 13.2 % — SIGNIFICANT CHANGE UP (ref 10.3–14.5)
RBC BLD AUTO: ABNORMAL
RBC BLD AUTO: ABNORMAL
RBC CASTS # UR COMP ASSIST: 0 /HPF — SIGNIFICANT CHANGE UP (ref 0–4)
RBC CASTS # UR COMP ASSIST: 0 /HPF — SIGNIFICANT CHANGE UP (ref 0–4)
RSV RNA NPH QL NAA+NON-PROBE: SIGNIFICANT CHANGE UP
RSV RNA NPH QL NAA+NON-PROBE: SIGNIFICANT CHANGE UP
SAO2 % BLDV: 91.3 % — HIGH (ref 67–88)
SAO2 % BLDV: 91.3 % — HIGH (ref 67–88)
SARS-COV-2 RNA SPEC QL NAA+PROBE: SIGNIFICANT CHANGE UP
SARS-COV-2 RNA SPEC QL NAA+PROBE: SIGNIFICANT CHANGE UP
SODIUM SERPL-SCNC: 130 MMOL/L — LOW (ref 135–145)
SODIUM SERPL-SCNC: 130 MMOL/L — LOW (ref 135–145)
SP GR SPEC: 1.02 — SIGNIFICANT CHANGE UP (ref 1–1.03)
SP GR SPEC: 1.02 — SIGNIFICANT CHANGE UP (ref 1–1.03)
SQUAMOUS # UR AUTO: 2 /HPF — SIGNIFICANT CHANGE UP (ref 0–5)
SQUAMOUS # UR AUTO: 2 /HPF — SIGNIFICANT CHANGE UP (ref 0–5)
UROBILINOGEN FLD QL: 0.2 MG/DL — SIGNIFICANT CHANGE UP (ref 0.2–1)
UROBILINOGEN FLD QL: 0.2 MG/DL — SIGNIFICANT CHANGE UP (ref 0.2–1)
VARIANT LYMPHS # BLD: 22.5 % — HIGH (ref 0–6)
VARIANT LYMPHS # BLD: 22.5 % — HIGH (ref 0–6)
WBC # BLD: 8.43 K/UL — SIGNIFICANT CHANGE UP (ref 3.8–10.5)
WBC # BLD: 8.43 K/UL — SIGNIFICANT CHANGE UP (ref 3.8–10.5)
WBC # FLD AUTO: 8.43 K/UL — SIGNIFICANT CHANGE UP (ref 3.8–10.5)
WBC # FLD AUTO: 8.43 K/UL — SIGNIFICANT CHANGE UP (ref 3.8–10.5)
WBC UR QL: 1 /HPF — SIGNIFICANT CHANGE UP (ref 0–5)
WBC UR QL: 1 /HPF — SIGNIFICANT CHANGE UP (ref 0–5)

## 2023-10-27 PROCEDURE — 93010 ELECTROCARDIOGRAM REPORT: CPT

## 2023-10-27 PROCEDURE — 99285 EMERGENCY DEPT VISIT HI MDM: CPT

## 2023-10-27 RX ORDER — GABAPENTIN 400 MG/1
1 CAPSULE ORAL
Qty: 90 | Refills: 0
Start: 2023-10-27 | End: 2023-11-25

## 2023-10-27 RX ORDER — METFORMIN HYDROCHLORIDE 850 MG/1
1 TABLET ORAL
Qty: 60 | Refills: 0
Start: 2023-10-27 | End: 2023-11-25

## 2023-10-27 RX ORDER — SODIUM CHLORIDE 9 MG/ML
1000 INJECTION INTRAMUSCULAR; INTRAVENOUS; SUBCUTANEOUS ONCE
Refills: 0 | Status: COMPLETED | OUTPATIENT
Start: 2023-10-27 | End: 2023-10-27

## 2023-10-27 RX ORDER — DIPHENHYDRAMINE HCL 50 MG
25 CAPSULE ORAL ONCE
Refills: 0 | Status: COMPLETED | OUTPATIENT
Start: 2023-10-27 | End: 2023-10-27

## 2023-10-27 RX ADMIN — SODIUM CHLORIDE 1000 MILLILITER(S): 9 INJECTION INTRAMUSCULAR; INTRAVENOUS; SUBCUTANEOUS at 03:16

## 2023-10-27 RX ADMIN — Medication 25 MILLIGRAM(S): at 03:49

## 2023-10-27 NOTE — ED PROVIDER NOTE - ATTENDING CONTRIBUTION TO CARE
Afebrile. Awake and Alert. Lungs CTA. Heart RRR. Abdomen soft NTND. CN II-XII grossly intact. Moves all extremities without lateralization. No LE edema.

## 2023-10-27 NOTE — ED PROVIDER NOTE - DIFFERENTIAL DIAGNOSIS
Differential Diagnosis Fevers for one month: r/o sepsis  Hyperglycemia: DKA vs medication non-compliance  Leg burning: no s/s fluid overload or DVT, possible diabetic neuropathy

## 2023-10-27 NOTE — ED ADULT NURSE NOTE - OBJECTIVE STATEMENT
Break coverage rpt; JEAN MARIE. Pt received to RM 5. Pt aox4, Pt st" I have fevers chill  on and off for a month and both my legs also but right now my left leg hurts and my knees hurt, also  my sugar is high ." Pt denies cp shortness of breath, Denies nausea abdpain. Pt st" I am hungry ." Pt placed on cm , vs as noted . Resident Mackenzie at bedside. Labs and meds to follow.

## 2023-10-27 NOTE — ED ADULT NURSE NOTE - CHIEF COMPLAINT QUOTE
pt c/o x1 month intermittent subjective fevers and chills, burning and itching in bilateral lower extremities. also c/o poor appetite, last oral intake noon yesterday.  Phx NIDDM 2 years ago, no longer takes medication.

## 2023-10-27 NOTE — ED PROVIDER NOTE - CLINICAL SUMMARY MEDICAL DECISION MAKING FREE TEXT BOX
63-year-old female with a past medical history of hypertension, diabetes, schizoaffective disorder currently not on any medications presenting with fever and chills for 1 month. Patient states that today she discharged feeling generally worse and decided she wanted to be evaluated in the emergency department. Patient also complaining of bilateral lower extremity burning sensation. Denies chest pain, difficulty breathing, abdominal pain, nausea, vomiting, sore throat, congestion.

## 2023-10-27 NOTE — ED ADULT NURSE NOTE - NSFALLUNIVINTERV_ED_ALL_ED
Bed/Stretcher in lowest position, wheels locked, appropriate side rails in place/Call bell, personal items and telephone in reach/Instruct patient to call for assistance before getting out of bed/chair/stretcher/Non-slip footwear applied when patient is off stretcher/[place] to call system/Physically safe environment - no spills, clutter or unnecessary equipment/Purposeful proactive rounding/Room/bathroom lighting operational, light cord in reach

## 2023-10-27 NOTE — ED ADULT TRIAGE NOTE - CHIEF COMPLAINT QUOTE
pt c/o x1 month intermittent subjective fevers and chills, burning and itching in bilateral lower extremities. Phx NIDDM 2 years ago, no longer takes medication. pt c/o x1 month intermittent subjective fevers and chills, burning and itching in bilateral lower extremities. also c/o poor appetite, last oral intake noon yesterday.  Phx NIDDM 2 years ago, no longer takes medication.

## 2023-10-27 NOTE — ED PROVIDER NOTE - PATIENT PORTAL LINK FT
You can access the FollowMyHealth Patient Portal offered by [facility] by registering at the following website: [email or web address]. By joining [employer]’s FollowMyHealth portal, you will also be able to view your health information using other applications (apps) compatible with our system.

## 2023-10-27 NOTE — ED PROVIDER NOTE - NSFOLLOWUPINSTRUCTIONS_ED_ALL_ED_FT
Please follow up with your primary care physician within the next 3-4 days.    Please follow up with your endocrinologist within 3-4 days.    Medication has been sent to your pharmacy, please take as directed.     Please return to the emergency department if you experience any of the following symptoms:    Fever  Chest pain  Difficulty breathing  Abdominal pain  Nausea  Vomiting

## 2023-10-27 NOTE — ED PROVIDER NOTE - OBJECTIVE STATEMENT
63-year-old female with a past medical history of hypertension, diabetes, schizoaffective disorder presenting with fever and chills. Patient states she has had intermittent fevers and chills over the last month. Patient also complaining of bilateral lower extremity burning sensation. Patient states she was taken off diabetes medication by her doctor. Patient denies chest pain, difficulty breathing, coughing, congestion, sore throat, abdominal pain, nausea, vomiting. Patient states that today she just did not feel well and decided come to the emergency department.

## 2023-10-28 LAB
CULTURE RESULTS: ABNORMAL
CULTURE RESULTS: ABNORMAL
SPECIMEN SOURCE: SIGNIFICANT CHANGE UP
SPECIMEN SOURCE: SIGNIFICANT CHANGE UP

## 2023-11-01 ENCOUNTER — INPATIENT (INPATIENT)
Facility: HOSPITAL | Age: 63
LOS: 1 days | Discharge: HOME | End: 2023-11-03
Attending: HOSPITALIST | Admitting: HOSPITALIST
Payer: MEDICAID

## 2023-11-01 VITALS
OXYGEN SATURATION: 99 % | HEART RATE: 110 BPM | TEMPERATURE: 98 F | SYSTOLIC BLOOD PRESSURE: 152 MMHG | DIASTOLIC BLOOD PRESSURE: 81 MMHG | RESPIRATION RATE: 18 BRPM

## 2023-11-01 DIAGNOSIS — I10 ESSENTIAL (PRIMARY) HYPERTENSION: ICD-10-CM

## 2023-11-01 DIAGNOSIS — E11.9 TYPE 2 DIABETES MELLITUS WITHOUT COMPLICATIONS: ICD-10-CM

## 2023-11-01 DIAGNOSIS — R78.81 BACTEREMIA: ICD-10-CM

## 2023-11-01 DIAGNOSIS — F25.9 SCHIZOAFFECTIVE DISORDER, UNSPECIFIED: ICD-10-CM

## 2023-11-01 LAB
-  BLOOD PCR PANEL: SIGNIFICANT CHANGE UP
-  BLOOD PCR PANEL: SIGNIFICANT CHANGE UP
ALBUMIN SERPL ELPH-MCNC: 4.8 G/DL — SIGNIFICANT CHANGE UP (ref 3.3–5)
ALBUMIN SERPL ELPH-MCNC: 4.8 G/DL — SIGNIFICANT CHANGE UP (ref 3.3–5)
ALP SERPL-CCNC: 94 U/L — SIGNIFICANT CHANGE UP (ref 40–120)
ALP SERPL-CCNC: 94 U/L — SIGNIFICANT CHANGE UP (ref 40–120)
ALT FLD-CCNC: 17 U/L — SIGNIFICANT CHANGE UP (ref 4–33)
ALT FLD-CCNC: 17 U/L — SIGNIFICANT CHANGE UP (ref 4–33)
ANION GAP SERPL CALC-SCNC: 15 MMOL/L — HIGH (ref 7–14)
ANION GAP SERPL CALC-SCNC: 15 MMOL/L — HIGH (ref 7–14)
APPEARANCE UR: CLEAR — SIGNIFICANT CHANGE UP
APPEARANCE UR: CLEAR — SIGNIFICANT CHANGE UP
APTT BLD: 28.2 SEC — SIGNIFICANT CHANGE UP (ref 24.5–35.6)
APTT BLD: 28.2 SEC — SIGNIFICANT CHANGE UP (ref 24.5–35.6)
AST SERPL-CCNC: 14 U/L — SIGNIFICANT CHANGE UP (ref 4–32)
AST SERPL-CCNC: 14 U/L — SIGNIFICANT CHANGE UP (ref 4–32)
B PERT DNA SPEC QL NAA+PROBE: SIGNIFICANT CHANGE UP
B PERT DNA SPEC QL NAA+PROBE: SIGNIFICANT CHANGE UP
B PERT+PARAPERT DNA PNL SPEC NAA+PROBE: SIGNIFICANT CHANGE UP
B PERT+PARAPERT DNA PNL SPEC NAA+PROBE: SIGNIFICANT CHANGE UP
BASE EXCESS BLDV CALC-SCNC: 2 MMOL/L — SIGNIFICANT CHANGE UP (ref -2–3)
BASE EXCESS BLDV CALC-SCNC: 2 MMOL/L — SIGNIFICANT CHANGE UP (ref -2–3)
BASE EXCESS BLDV CALC-SCNC: 2.3 MMOL/L — SIGNIFICANT CHANGE UP (ref -2–3)
BASE EXCESS BLDV CALC-SCNC: 2.3 MMOL/L — SIGNIFICANT CHANGE UP (ref -2–3)
BASOPHILS # BLD AUTO: 0.05 K/UL — SIGNIFICANT CHANGE UP (ref 0–0.2)
BASOPHILS # BLD AUTO: 0.05 K/UL — SIGNIFICANT CHANGE UP (ref 0–0.2)
BASOPHILS NFR BLD AUTO: 0.5 % — SIGNIFICANT CHANGE UP (ref 0–2)
BASOPHILS NFR BLD AUTO: 0.5 % — SIGNIFICANT CHANGE UP (ref 0–2)
BILIRUB SERPL-MCNC: 0.5 MG/DL — SIGNIFICANT CHANGE UP (ref 0.2–1.2)
BILIRUB SERPL-MCNC: 0.5 MG/DL — SIGNIFICANT CHANGE UP (ref 0.2–1.2)
BILIRUB UR-MCNC: NEGATIVE — SIGNIFICANT CHANGE UP
BILIRUB UR-MCNC: NEGATIVE — SIGNIFICANT CHANGE UP
BLOOD GAS VENOUS COMPREHENSIVE RESULT: SIGNIFICANT CHANGE UP
BORDETELLA PARAPERTUSSIS (RAPRVP): SIGNIFICANT CHANGE UP
BORDETELLA PARAPERTUSSIS (RAPRVP): SIGNIFICANT CHANGE UP
BUN SERPL-MCNC: 7 MG/DL — SIGNIFICANT CHANGE UP (ref 7–23)
BUN SERPL-MCNC: 7 MG/DL — SIGNIFICANT CHANGE UP (ref 7–23)
C PNEUM DNA SPEC QL NAA+PROBE: SIGNIFICANT CHANGE UP
C PNEUM DNA SPEC QL NAA+PROBE: SIGNIFICANT CHANGE UP
CALCIUM SERPL-MCNC: 9.7 MG/DL — SIGNIFICANT CHANGE UP (ref 8.4–10.5)
CALCIUM SERPL-MCNC: 9.7 MG/DL — SIGNIFICANT CHANGE UP (ref 8.4–10.5)
CHLORIDE BLDV-SCNC: 100 MMOL/L — SIGNIFICANT CHANGE UP (ref 96–108)
CHLORIDE BLDV-SCNC: 100 MMOL/L — SIGNIFICANT CHANGE UP (ref 96–108)
CHLORIDE BLDV-SCNC: 95 MMOL/L — LOW (ref 96–108)
CHLORIDE BLDV-SCNC: 95 MMOL/L — LOW (ref 96–108)
CHLORIDE SERPL-SCNC: 94 MMOL/L — LOW (ref 98–107)
CHLORIDE SERPL-SCNC: 94 MMOL/L — LOW (ref 98–107)
CO2 BLDV-SCNC: 28.6 MMOL/L — HIGH (ref 22–26)
CO2 BLDV-SCNC: 28.6 MMOL/L — HIGH (ref 22–26)
CO2 BLDV-SCNC: 28.7 MMOL/L — HIGH (ref 22–26)
CO2 BLDV-SCNC: 28.7 MMOL/L — HIGH (ref 22–26)
CO2 SERPL-SCNC: 24 MMOL/L — SIGNIFICANT CHANGE UP (ref 22–31)
CO2 SERPL-SCNC: 24 MMOL/L — SIGNIFICANT CHANGE UP (ref 22–31)
COLOR SPEC: YELLOW — SIGNIFICANT CHANGE UP
COLOR SPEC: YELLOW — SIGNIFICANT CHANGE UP
CREAT SERPL-MCNC: 0.45 MG/DL — LOW (ref 0.5–1.3)
CREAT SERPL-MCNC: 0.45 MG/DL — LOW (ref 0.5–1.3)
CULTURE RESULTS: SIGNIFICANT CHANGE UP
CULTURE RESULTS: SIGNIFICANT CHANGE UP
DIFF PNL FLD: NEGATIVE — SIGNIFICANT CHANGE UP
DIFF PNL FLD: NEGATIVE — SIGNIFICANT CHANGE UP
EGFR: 107 ML/MIN/1.73M2 — SIGNIFICANT CHANGE UP
EGFR: 107 ML/MIN/1.73M2 — SIGNIFICANT CHANGE UP
EOSINOPHIL # BLD AUTO: 0.04 K/UL — SIGNIFICANT CHANGE UP (ref 0–0.5)
EOSINOPHIL # BLD AUTO: 0.04 K/UL — SIGNIFICANT CHANGE UP (ref 0–0.5)
EOSINOPHIL NFR BLD AUTO: 0.4 % — SIGNIFICANT CHANGE UP (ref 0–6)
EOSINOPHIL NFR BLD AUTO: 0.4 % — SIGNIFICANT CHANGE UP (ref 0–6)
FLUAV SUBTYP SPEC NAA+PROBE: SIGNIFICANT CHANGE UP
FLUAV SUBTYP SPEC NAA+PROBE: SIGNIFICANT CHANGE UP
FLUBV RNA SPEC QL NAA+PROBE: SIGNIFICANT CHANGE UP
FLUBV RNA SPEC QL NAA+PROBE: SIGNIFICANT CHANGE UP
GAS PNL BLDV: 130 MMOL/L — LOW (ref 136–145)
GAS PNL BLDV: 130 MMOL/L — LOW (ref 136–145)
GAS PNL BLDV: 133 MMOL/L — LOW (ref 136–145)
GAS PNL BLDV: 133 MMOL/L — LOW (ref 136–145)
GAS PNL BLDV: SIGNIFICANT CHANGE UP
GAS PNL BLDV: SIGNIFICANT CHANGE UP
GLUCOSE BLDC GLUCOMTR-MCNC: 201 MG/DL — HIGH (ref 70–99)
GLUCOSE BLDC GLUCOMTR-MCNC: 201 MG/DL — HIGH (ref 70–99)
GLUCOSE BLDC GLUCOMTR-MCNC: 262 MG/DL — HIGH (ref 70–99)
GLUCOSE BLDC GLUCOMTR-MCNC: 262 MG/DL — HIGH (ref 70–99)
GLUCOSE BLDC GLUCOMTR-MCNC: 289 MG/DL — HIGH (ref 70–99)
GLUCOSE BLDC GLUCOMTR-MCNC: 289 MG/DL — HIGH (ref 70–99)
GLUCOSE BLDV-MCNC: 335 MG/DL — HIGH (ref 70–99)
GLUCOSE BLDV-MCNC: 335 MG/DL — HIGH (ref 70–99)
GLUCOSE BLDV-MCNC: 347 MG/DL — HIGH (ref 70–99)
GLUCOSE BLDV-MCNC: 347 MG/DL — HIGH (ref 70–99)
GLUCOSE SERPL-MCNC: 311 MG/DL — HIGH (ref 70–99)
GLUCOSE SERPL-MCNC: 311 MG/DL — HIGH (ref 70–99)
GLUCOSE UR QL: >=1000 MG/DL
GLUCOSE UR QL: >=1000 MG/DL
GRAM STN FLD: ABNORMAL
GRAM STN FLD: ABNORMAL
HADV DNA SPEC QL NAA+PROBE: SIGNIFICANT CHANGE UP
HADV DNA SPEC QL NAA+PROBE: SIGNIFICANT CHANGE UP
HCO3 BLDV-SCNC: 27 MMOL/L — SIGNIFICANT CHANGE UP (ref 22–29)
HCOV 229E RNA SPEC QL NAA+PROBE: SIGNIFICANT CHANGE UP
HCOV 229E RNA SPEC QL NAA+PROBE: SIGNIFICANT CHANGE UP
HCOV HKU1 RNA SPEC QL NAA+PROBE: SIGNIFICANT CHANGE UP
HCOV HKU1 RNA SPEC QL NAA+PROBE: SIGNIFICANT CHANGE UP
HCOV NL63 RNA SPEC QL NAA+PROBE: SIGNIFICANT CHANGE UP
HCOV NL63 RNA SPEC QL NAA+PROBE: SIGNIFICANT CHANGE UP
HCOV OC43 RNA SPEC QL NAA+PROBE: SIGNIFICANT CHANGE UP
HCOV OC43 RNA SPEC QL NAA+PROBE: SIGNIFICANT CHANGE UP
HCT VFR BLD CALC: 41 % — SIGNIFICANT CHANGE UP (ref 34.5–45)
HCT VFR BLD CALC: 41 % — SIGNIFICANT CHANGE UP (ref 34.5–45)
HCT VFR BLDA CALC: 40 % — SIGNIFICANT CHANGE UP (ref 34.5–46.5)
HCT VFR BLDA CALC: 40 % — SIGNIFICANT CHANGE UP (ref 34.5–46.5)
HCT VFR BLDA CALC: 44 % — SIGNIFICANT CHANGE UP (ref 34.5–46.5)
HCT VFR BLDA CALC: 44 % — SIGNIFICANT CHANGE UP (ref 34.5–46.5)
HGB BLD CALC-MCNC: 13.2 G/DL — SIGNIFICANT CHANGE UP (ref 11.7–16.1)
HGB BLD CALC-MCNC: 13.2 G/DL — SIGNIFICANT CHANGE UP (ref 11.7–16.1)
HGB BLD CALC-MCNC: 14.8 G/DL — SIGNIFICANT CHANGE UP (ref 11.7–16.1)
HGB BLD CALC-MCNC: 14.8 G/DL — SIGNIFICANT CHANGE UP (ref 11.7–16.1)
HGB BLD-MCNC: 14.2 G/DL — SIGNIFICANT CHANGE UP (ref 11.5–15.5)
HGB BLD-MCNC: 14.2 G/DL — SIGNIFICANT CHANGE UP (ref 11.5–15.5)
HMPV RNA SPEC QL NAA+PROBE: SIGNIFICANT CHANGE UP
HMPV RNA SPEC QL NAA+PROBE: SIGNIFICANT CHANGE UP
HPIV1 RNA SPEC QL NAA+PROBE: SIGNIFICANT CHANGE UP
HPIV1 RNA SPEC QL NAA+PROBE: SIGNIFICANT CHANGE UP
HPIV2 RNA SPEC QL NAA+PROBE: SIGNIFICANT CHANGE UP
HPIV2 RNA SPEC QL NAA+PROBE: SIGNIFICANT CHANGE UP
HPIV3 RNA SPEC QL NAA+PROBE: SIGNIFICANT CHANGE UP
HPIV3 RNA SPEC QL NAA+PROBE: SIGNIFICANT CHANGE UP
HPIV4 RNA SPEC QL NAA+PROBE: SIGNIFICANT CHANGE UP
HPIV4 RNA SPEC QL NAA+PROBE: SIGNIFICANT CHANGE UP
IANC: 5.24 K/UL — SIGNIFICANT CHANGE UP (ref 1.8–7.4)
IANC: 5.24 K/UL — SIGNIFICANT CHANGE UP (ref 1.8–7.4)
IMM GRANULOCYTES NFR BLD AUTO: 0.4 % — SIGNIFICANT CHANGE UP (ref 0–0.9)
IMM GRANULOCYTES NFR BLD AUTO: 0.4 % — SIGNIFICANT CHANGE UP (ref 0–0.9)
INR BLD: 1.01 RATIO — SIGNIFICANT CHANGE UP (ref 0.85–1.18)
INR BLD: 1.01 RATIO — SIGNIFICANT CHANGE UP (ref 0.85–1.18)
KETONES UR-MCNC: NEGATIVE MG/DL — SIGNIFICANT CHANGE UP
KETONES UR-MCNC: NEGATIVE MG/DL — SIGNIFICANT CHANGE UP
LACTATE BLDV-MCNC: 2.8 MMOL/L — HIGH (ref 0.5–2)
LACTATE BLDV-MCNC: 2.8 MMOL/L — HIGH (ref 0.5–2)
LACTATE BLDV-MCNC: 2.9 MMOL/L — HIGH (ref 0.5–2)
LACTATE BLDV-MCNC: 2.9 MMOL/L — HIGH (ref 0.5–2)
LEUKOCYTE ESTERASE UR-ACNC: NEGATIVE — SIGNIFICANT CHANGE UP
LEUKOCYTE ESTERASE UR-ACNC: NEGATIVE — SIGNIFICANT CHANGE UP
LYMPHOCYTES # BLD AUTO: 3.45 K/UL — HIGH (ref 1–3.3)
LYMPHOCYTES # BLD AUTO: 3.45 K/UL — HIGH (ref 1–3.3)
LYMPHOCYTES # BLD AUTO: 36.5 % — SIGNIFICANT CHANGE UP (ref 13–44)
LYMPHOCYTES # BLD AUTO: 36.5 % — SIGNIFICANT CHANGE UP (ref 13–44)
M PNEUMO DNA SPEC QL NAA+PROBE: SIGNIFICANT CHANGE UP
M PNEUMO DNA SPEC QL NAA+PROBE: SIGNIFICANT CHANGE UP
MCHC RBC-ENTMCNC: 24.8 PG — LOW (ref 27–34)
MCHC RBC-ENTMCNC: 24.8 PG — LOW (ref 27–34)
MCHC RBC-ENTMCNC: 34.6 GM/DL — SIGNIFICANT CHANGE UP (ref 32–36)
MCHC RBC-ENTMCNC: 34.6 GM/DL — SIGNIFICANT CHANGE UP (ref 32–36)
MCV RBC AUTO: 71.6 FL — LOW (ref 80–100)
MCV RBC AUTO: 71.6 FL — LOW (ref 80–100)
METHOD TYPE: SIGNIFICANT CHANGE UP
METHOD TYPE: SIGNIFICANT CHANGE UP
MONOCYTES # BLD AUTO: 0.64 K/UL — SIGNIFICANT CHANGE UP (ref 0–0.9)
MONOCYTES # BLD AUTO: 0.64 K/UL — SIGNIFICANT CHANGE UP (ref 0–0.9)
MONOCYTES NFR BLD AUTO: 6.8 % — SIGNIFICANT CHANGE UP (ref 2–14)
MONOCYTES NFR BLD AUTO: 6.8 % — SIGNIFICANT CHANGE UP (ref 2–14)
NEUTROPHILS # BLD AUTO: 5.24 K/UL — SIGNIFICANT CHANGE UP (ref 1.8–7.4)
NEUTROPHILS # BLD AUTO: 5.24 K/UL — SIGNIFICANT CHANGE UP (ref 1.8–7.4)
NEUTROPHILS NFR BLD AUTO: 55.4 % — SIGNIFICANT CHANGE UP (ref 43–77)
NEUTROPHILS NFR BLD AUTO: 55.4 % — SIGNIFICANT CHANGE UP (ref 43–77)
NITRITE UR-MCNC: NEGATIVE — SIGNIFICANT CHANGE UP
NITRITE UR-MCNC: NEGATIVE — SIGNIFICANT CHANGE UP
NRBC # BLD: 0 /100 WBCS — SIGNIFICANT CHANGE UP (ref 0–0)
NRBC # BLD: 0 /100 WBCS — SIGNIFICANT CHANGE UP (ref 0–0)
NRBC # FLD: 0 K/UL — SIGNIFICANT CHANGE UP (ref 0–0)
NRBC # FLD: 0 K/UL — SIGNIFICANT CHANGE UP (ref 0–0)
PCO2 BLDV: 43 MMHG — SIGNIFICANT CHANGE UP (ref 39–52)
PCO2 BLDV: 43 MMHG — SIGNIFICANT CHANGE UP (ref 39–52)
PCO2 BLDV: 44 MMHG — SIGNIFICANT CHANGE UP (ref 39–52)
PCO2 BLDV: 44 MMHG — SIGNIFICANT CHANGE UP (ref 39–52)
PH BLDV: 7.4 — SIGNIFICANT CHANGE UP (ref 7.32–7.43)
PH BLDV: 7.4 — SIGNIFICANT CHANGE UP (ref 7.32–7.43)
PH BLDV: 7.41 — SIGNIFICANT CHANGE UP (ref 7.32–7.43)
PH BLDV: 7.41 — SIGNIFICANT CHANGE UP (ref 7.32–7.43)
PH UR: 6.5 — SIGNIFICANT CHANGE UP (ref 5–8)
PH UR: 6.5 — SIGNIFICANT CHANGE UP (ref 5–8)
PLATELET # BLD AUTO: 375 K/UL — SIGNIFICANT CHANGE UP (ref 150–400)
PLATELET # BLD AUTO: 375 K/UL — SIGNIFICANT CHANGE UP (ref 150–400)
PO2 BLDV: 37 MMHG — SIGNIFICANT CHANGE UP (ref 25–45)
PO2 BLDV: 37 MMHG — SIGNIFICANT CHANGE UP (ref 25–45)
PO2 BLDV: 50 MMHG — HIGH (ref 25–45)
PO2 BLDV: 50 MMHG — HIGH (ref 25–45)
POTASSIUM BLDV-SCNC: 3.8 MMOL/L — SIGNIFICANT CHANGE UP (ref 3.5–5.1)
POTASSIUM BLDV-SCNC: 3.8 MMOL/L — SIGNIFICANT CHANGE UP (ref 3.5–5.1)
POTASSIUM BLDV-SCNC: 3.9 MMOL/L — SIGNIFICANT CHANGE UP (ref 3.5–5.1)
POTASSIUM BLDV-SCNC: 3.9 MMOL/L — SIGNIFICANT CHANGE UP (ref 3.5–5.1)
POTASSIUM SERPL-MCNC: 3.9 MMOL/L — SIGNIFICANT CHANGE UP (ref 3.5–5.3)
POTASSIUM SERPL-MCNC: 3.9 MMOL/L — SIGNIFICANT CHANGE UP (ref 3.5–5.3)
POTASSIUM SERPL-SCNC: 3.9 MMOL/L — SIGNIFICANT CHANGE UP (ref 3.5–5.3)
POTASSIUM SERPL-SCNC: 3.9 MMOL/L — SIGNIFICANT CHANGE UP (ref 3.5–5.3)
PROT SERPL-MCNC: 7.3 G/DL — SIGNIFICANT CHANGE UP (ref 6–8.3)
PROT SERPL-MCNC: 7.3 G/DL — SIGNIFICANT CHANGE UP (ref 6–8.3)
PROT UR-MCNC: NEGATIVE MG/DL — SIGNIFICANT CHANGE UP
PROT UR-MCNC: NEGATIVE MG/DL — SIGNIFICANT CHANGE UP
PROTHROM AB SERPL-ACNC: 11.3 SEC — SIGNIFICANT CHANGE UP (ref 9.5–13)
PROTHROM AB SERPL-ACNC: 11.3 SEC — SIGNIFICANT CHANGE UP (ref 9.5–13)
RAPID RVP RESULT: SIGNIFICANT CHANGE UP
RAPID RVP RESULT: SIGNIFICANT CHANGE UP
RBC # BLD: 5.73 M/UL — HIGH (ref 3.8–5.2)
RBC # BLD: 5.73 M/UL — HIGH (ref 3.8–5.2)
RBC # FLD: 13.3 % — SIGNIFICANT CHANGE UP (ref 10.3–14.5)
RBC # FLD: 13.3 % — SIGNIFICANT CHANGE UP (ref 10.3–14.5)
RSV RNA SPEC QL NAA+PROBE: SIGNIFICANT CHANGE UP
RSV RNA SPEC QL NAA+PROBE: SIGNIFICANT CHANGE UP
RV+EV RNA SPEC QL NAA+PROBE: SIGNIFICANT CHANGE UP
RV+EV RNA SPEC QL NAA+PROBE: SIGNIFICANT CHANGE UP
SAO2 % BLDV: 63.4 % — LOW (ref 67–88)
SAO2 % BLDV: 63.4 % — LOW (ref 67–88)
SAO2 % BLDV: 83 % — SIGNIFICANT CHANGE UP (ref 67–88)
SAO2 % BLDV: 83 % — SIGNIFICANT CHANGE UP (ref 67–88)
SARS-COV-2 RNA SPEC QL NAA+PROBE: SIGNIFICANT CHANGE UP
SARS-COV-2 RNA SPEC QL NAA+PROBE: SIGNIFICANT CHANGE UP
SODIUM SERPL-SCNC: 133 MMOL/L — LOW (ref 135–145)
SODIUM SERPL-SCNC: 133 MMOL/L — LOW (ref 135–145)
SP GR SPEC: 1.01 — SIGNIFICANT CHANGE UP (ref 1–1.03)
SP GR SPEC: 1.01 — SIGNIFICANT CHANGE UP (ref 1–1.03)
SPECIMEN SOURCE: SIGNIFICANT CHANGE UP
SPECIMEN SOURCE: SIGNIFICANT CHANGE UP
UROBILINOGEN FLD QL: 0.2 MG/DL — SIGNIFICANT CHANGE UP (ref 0.2–1)
UROBILINOGEN FLD QL: 0.2 MG/DL — SIGNIFICANT CHANGE UP (ref 0.2–1)
WBC # BLD: 9.46 K/UL — SIGNIFICANT CHANGE UP (ref 3.8–10.5)
WBC # BLD: 9.46 K/UL — SIGNIFICANT CHANGE UP (ref 3.8–10.5)
WBC # FLD AUTO: 9.46 K/UL — SIGNIFICANT CHANGE UP (ref 3.8–10.5)
WBC # FLD AUTO: 9.46 K/UL — SIGNIFICANT CHANGE UP (ref 3.8–10.5)

## 2023-11-01 PROCEDURE — 99223 1ST HOSP IP/OBS HIGH 75: CPT

## 2023-11-01 PROCEDURE — 71046 X-RAY EXAM CHEST 2 VIEWS: CPT | Mod: 26

## 2023-11-01 PROCEDURE — 99285 EMERGENCY DEPT VISIT HI MDM: CPT

## 2023-11-01 RX ORDER — DEXTROSE 50 % IN WATER 50 %
25 SYRINGE (ML) INTRAVENOUS ONCE
Refills: 0 | Status: DISCONTINUED | OUTPATIENT
Start: 2023-11-01 | End: 2023-11-03

## 2023-11-01 RX ORDER — DEXTROSE 50 % IN WATER 50 %
15 SYRINGE (ML) INTRAVENOUS ONCE
Refills: 0 | Status: DISCONTINUED | OUTPATIENT
Start: 2023-11-01 | End: 2023-11-03

## 2023-11-01 RX ORDER — INSULIN LISPRO 100/ML
VIAL (ML) SUBCUTANEOUS
Refills: 0 | Status: DISCONTINUED | OUTPATIENT
Start: 2023-11-01 | End: 2023-11-03

## 2023-11-01 RX ORDER — PIPERACILLIN AND TAZOBACTAM 4; .5 G/20ML; G/20ML
3.38 INJECTION, POWDER, LYOPHILIZED, FOR SOLUTION INTRAVENOUS ONCE
Refills: 0 | Status: COMPLETED | OUTPATIENT
Start: 2023-11-01 | End: 2023-11-01

## 2023-11-01 RX ORDER — LANOLIN ALCOHOL/MO/W.PET/CERES
3 CREAM (GRAM) TOPICAL AT BEDTIME
Refills: 0 | Status: DISCONTINUED | OUTPATIENT
Start: 2023-11-01 | End: 2023-11-03

## 2023-11-01 RX ORDER — GLUCAGON INJECTION, SOLUTION 0.5 MG/.1ML
1 INJECTION, SOLUTION SUBCUTANEOUS ONCE
Refills: 0 | Status: DISCONTINUED | OUTPATIENT
Start: 2023-11-01 | End: 2023-11-02

## 2023-11-01 RX ORDER — ACETAMINOPHEN 500 MG
650 TABLET ORAL ONCE
Refills: 0 | Status: COMPLETED | OUTPATIENT
Start: 2023-11-01 | End: 2023-11-01

## 2023-11-01 RX ORDER — IBUPROFEN 200 MG
600 TABLET ORAL ONCE
Refills: 0 | Status: COMPLETED | OUTPATIENT
Start: 2023-11-01 | End: 2023-11-01

## 2023-11-01 RX ORDER — ACETAMINOPHEN 500 MG
650 TABLET ORAL EVERY 6 HOURS
Refills: 0 | Status: DISCONTINUED | OUTPATIENT
Start: 2023-11-01 | End: 2023-11-02

## 2023-11-01 RX ORDER — DEXTROSE 50 % IN WATER 50 %
12.5 SYRINGE (ML) INTRAVENOUS ONCE
Refills: 0 | Status: DISCONTINUED | OUTPATIENT
Start: 2023-11-01 | End: 2023-11-03

## 2023-11-01 RX ORDER — SODIUM CHLORIDE 9 MG/ML
1000 INJECTION, SOLUTION INTRAVENOUS
Refills: 0 | Status: DISCONTINUED | OUTPATIENT
Start: 2023-11-01 | End: 2023-11-02

## 2023-11-01 RX ORDER — SODIUM CHLORIDE 9 MG/ML
1000 INJECTION INTRAMUSCULAR; INTRAVENOUS; SUBCUTANEOUS ONCE
Refills: 0 | Status: COMPLETED | OUTPATIENT
Start: 2023-11-01 | End: 2023-11-01

## 2023-11-01 RX ADMIN — SODIUM CHLORIDE 1000 MILLILITER(S): 9 INJECTION INTRAMUSCULAR; INTRAVENOUS; SUBCUTANEOUS at 15:00

## 2023-11-01 RX ADMIN — Medication 650 MILLIGRAM(S): at 18:30

## 2023-11-01 RX ADMIN — PIPERACILLIN AND TAZOBACTAM 3.38 GRAM(S): 4; .5 INJECTION, POWDER, LYOPHILIZED, FOR SOLUTION INTRAVENOUS at 13:15

## 2023-11-01 RX ADMIN — Medication 650 MILLIGRAM(S): at 14:23

## 2023-11-01 RX ADMIN — PIPERACILLIN AND TAZOBACTAM 200 GRAM(S): 4; .5 INJECTION, POWDER, LYOPHILIZED, FOR SOLUTION INTRAVENOUS at 12:27

## 2023-11-01 RX ADMIN — Medication 600 MILLIGRAM(S): at 23:59

## 2023-11-01 RX ADMIN — Medication 650 MILLIGRAM(S): at 14:52

## 2023-11-01 RX ADMIN — Medication 3: at 20:03

## 2023-11-01 RX ADMIN — SODIUM CHLORIDE 1000 MILLILITER(S): 9 INJECTION INTRAMUSCULAR; INTRAVENOUS; SUBCUTANEOUS at 13:18

## 2023-11-01 RX ADMIN — Medication 650 MILLIGRAM(S): at 17:38

## 2023-11-01 NOTE — H&P ADULT - NSHPPHYSICALEXAM_GEN_ALL_CORE
Vital Signs Last 24 Hrs  T(C): 37.4 (01 Nov 2023 20:59), Max: 37.5 (01 Nov 2023 16:41)  T(F): 99.3 (01 Nov 2023 20:59), Max: 99.5 (01 Nov 2023 16:41)  HR: 102 (01 Nov 2023 20:59) (102 - 116)  BP: 124/64 (01 Nov 2023 20:59) (124/64 - 155/91)  BP(mean): --  RR: 16 (01 Nov 2023 20:59) (16 - 18)  SpO2: 98% (01 Nov 2023 20:59) (98% - 100%)    Parameters below as of 01 Nov 2023 20:59  Patient On (Oxygen Delivery Method): room air        PHYSICAL EXAM:  GENERAL: No Acute Distress  EYES: conjunctiva and sclera clear  ENMT: Moist mucous membranes   NECK: Supple  PULMONARY: Clear to auscultation bilaterally  CARDIAC: Regular rate and rhythm; No murmurs, rubs, or gallops  GASTROINTESTINAL: Abdomen soft, Nontender, Nondistended; Bowel sounds normal  EXTREMITIES:   No clubbing, cyanosis, or pedal edema  PSYCH: Normal Affect, Normal Behavior  NEUROLOGY:   - Mental status A&O x 3  - Motor: Strenght 5/5 BLE's  - Sensation: normal LT BLE's  SKIN: No rashes or lesions  MUSCULOSKELETAL: No joint swelling

## 2023-11-01 NOTE — ED ADULT TRIAGE NOTE - CHIEF COMPLAINT QUOTE
pt reports was instructed by outpatient clinic to come to ER for testing positive for inflection. per chart review pt with positive tests in urine dn blood clotures, phx of DM type 2 , schizophrenia.

## 2023-11-01 NOTE — H&P ADULT - PROBLEM SELECTOR PLAN 1
GP rods in 1/4 bottles possibly represent contaminant from skin tk.  Pt non-toxic appearing.  - will monitor off abx  - f/u final speciation  - repeat cultures taken in ED

## 2023-11-01 NOTE — ED PROVIDER NOTE - ATTENDING APP SHARED VISIT CONTRIBUTION OF CARE
GEN - NAD; A+O x3   HEAD - NC/AT   EYES- PERRL, EOMI  ENT: Airway patent, mmm, Oral cavity and pharynx normal. No inflammation, swelling, exudate, or lesions.  NECK: Neck supple  PULMONARY - CTA b/l, symmetric breath sounds.   CARDIAC -s1s2, RRR, no M,G,R  ABDOMEN - +BS, ND, NT, soft, no guarding, no rebound, no masses   BACK - no CVA tenderness, Normal  spine   EXTREMITIES - FROM, symmetric pulses, capillary refill < 2 seconds, no edema   SKIN - no rash or bruising   NEUROLOGIC - alert, speech clear, no focal deficits    agree with above mdm

## 2023-11-01 NOTE — ED POST DISCHARGE NOTE - DETAILS
Called mother Eugenie Camilo at [phone] multiple times and left a message regarding her daughters abnormal lab results that require her to return to the hospital immediately to address further. phone number [phone] is not in-service Called alternate listed number [phone] multiple times and left a message regarding her daughters abnormal lab results that require her to return to the hospital immediately to address further.

## 2023-11-01 NOTE — ED ADULT TRIAGE NOTE - GLASGOW COMA SCALE: BEST VERBAL RESPONSE, MLM
Nutrition Assessment    Type and Reason for Visit: Initial(Protocol Day 1)    Nutrition Recommendations: Continue NPO status. Start diet vs nutrition support as medically able. Nutrition Assessment:  Pt s/p CABG, intubated and sedated. Pt plans to return to OR for for closure today. Will monitor nutrition progression. Malnutrition Assessment:  · Malnutrition Status: Insufficient data  · Context: Acute illness or injury  · Findings of the 6 clinical characteristics of malnutrition (Minimum of 2 out of 6 clinical characteristics is required to make the diagnosis of moderate or severe Protein Calorie Malnutrition based on AND/ASPEN Guidelines):  1. Energy Intake-Unable to assess,      2. Weight Loss-Unable to assess,    3. Fat Loss-Unable to assess,    4. Muscle Loss-Unable to assess,    5. Fluid Accumulation-Mild fluid accumulation, Extremities  6.   Strength-Not measured    Nutrition Risk Level: High    Nutrient Needs:  · Estimated Daily Total Kcal: 20-25 kcal/kg ~>1288-0041 kcals/d   · Estimated Daily Protein (g): 1.2-2.0 gm/kg ~> gms/d     Nutrition Diagnosis:   · Problem: Inadequate oral intake  · Etiology: related to Impaired respiratory function-inability to consume food, Insufficient energy/nutrient consumption     Signs and symptoms:  as evidenced by NPO status due to medical condition, Intubation    Objective Information:  · Nutrition-Focused Physical Findings: hypoactive bowel sounds  · Wound Type: Surgical Wound, Multiple, Open Wounds  · Current Nutrition Therapies:  · Oral Diet Orders: NPO   · Anthropometric Measures:  · Ht: 5' 8\" (172.7 cm)   · Current Body Wt: 176 lb (79.8 kg)  · Admission Body Wt: 176 lb (79.8 kg)  · % Weight Change:  ,  No wt hx in EMR   · Ideal Body Wt: 154 lb (69.9 kg), % Ideal Body 114% (adm/ideal)  · BMI Classification: BMI 25.0 - 29.9 Overweight    Nutrition Interventions:   Continue NPO(Start diet vs nutrition support as able)  Continued Inpatient Monitoring, Education not appropriate at this time    Nutrition Evaluation:   · Evaluation: Goals set   · Goals: Initiate po diet vs nutrition support as able    · Monitoring: Nutrition Progression, Skin Integrity, I&O, Wound Healing, Monitor Hemodynamic Status, Pertinent Labs, Weight, Monitor Bowel Function      Electronically signed by Romy Merritt RD, LD on 4/8/19 at 12:01 PM    Contact Number: [phone] ([phone] oriented

## 2023-11-01 NOTE — H&P ADULT - ASSESSMENT
62 y/o F with pmh of schizoaffective disorder, HTN, and DM type 2 presents to the ED after she was called back for positive blood cultures.

## 2023-11-01 NOTE — ED ADULT NURSE NOTE - NSFALLUNIVINTERV_ED_ALL_ED
Bed/Stretcher in lowest position, wheels locked, appropriate side rails in place/Call bell, personal items and telephone in reach/Instruct patient to call for assistance before getting out of bed/chair/stretcher/Non-slip footwear applied when patient is off stretcher/[place] to call system/Physically safe environment - no spills, clutter or unnecessary equipment/Purposeful proactive rounding/Room/bathroom lighting operational, light cord in reach

## 2023-11-01 NOTE — ED ADULT NURSE NOTE - OBJECTIVE STATEMENT
pt sent in d/t + blood cultures done 3 day ago, pt c/o intermittent fevers x 2 weeks with LE leg pain, no noted swelling, bruising. pt denies n/v/d/ abdominal pain, changes in urination. elevated FS noted in triage. #20 g piv placed in right ac, labs & cultures sent. pt started on zosyn and IVF. pt aax3, ambulatory at baseline. pt breathing stable, no s/ s of acute distress. will ctm Treatment Number: 1

## 2023-11-01 NOTE — ED PROVIDER NOTE - CLINICAL SUMMARY MEDICAL DECISION MAKING FREE TEXT BOX
62 yo F with PMH of NIDDM T2, HTN, schizophrenia, presents to ED c/o recall for positive blood culture today. well appearing female. afebrile. concern for bacteremia, UTI. Plan: labs, cultures, IV abx and admission. 62 yo F with PMH of NIDDM T2, HTN, schizophrenia, presents to ED c/o recall for positive blood culture today. well appearing female. afebrile. Still reporting symptoms of fever/chills at home. concern for bacteremia, UTI. Plan: labs, cultures, IV abx and admission.

## 2023-11-01 NOTE — ED PROVIDER NOTE - OBJECTIVE STATEMENT
64 yo F with PMH of NIDDM T2, HTN, schizophrenia, presents to ED c/o recall for positive blood culture today. Pt reports having subjective fever and chills, body aches. Denies any cough, sore throat, headache, n/v/d, abdominal pain, weakness, numbness, or any other complaints. 62 yo F with PMH of NIDDM T2, HTN, schizophrenia, presents to ED c/o recall for positive blood culture today. Pt reports having subjective fever and chills, body aches. Denies any cough, sore throat, headache, n/v/d, abdominal pain, weakness, numbness, or any other complaints. Reports legs have been itchy for years, no swelling/color change.

## 2023-11-01 NOTE — H&P ADULT - NSHPREVIEWOFSYSTEMS_GEN_ALL_CORE
Review of Systems:   CONSTITUTIONAL: subjective fever and chills  EYES: No eye pain, visual disturbances, or discharge  ENMT:  No difficulty hearing, no throat pain  NECK: No pain or stiffness  RESPIRATORY: No cough, No shortness of breath  CARDIOVASCULAR: No chest pain, palpitations, dizziness, or leg swelling  GASTROINTESTINAL: No abdominal pain, nausea, vomiting or diarrhea  GENITOURINARY: No dysuria, or hematuria  NEUROLOGICAL: No headaches, weakness, or numbness  SKIN: No rashes, or lesions   LYMPH NODES: No enlarged glands  ENDOCRINE: No heat or cold intolerance  MUSCULOSKELETAL: LLE pain as above  PSYCHIATRIC: No depression or anxiety, no SI or AVH.  HEME/LYMPH: No easy bruising, or bleeding  ALLERGY AND IMMUNOLOGIC: No hives or eczema

## 2023-11-01 NOTE — ED POST DISCHARGE NOTE - ADDITIONAL DOCUMENTATION
Was unable to successfully contact patient at the time the blood culture report was received despite multiple attempts made to each of the emergency contact numbers provided. Will reach out to the callback service to have them attempt to reach the patient as well later in the day.

## 2023-11-01 NOTE — ED PROVIDER NOTE - NS ED ATTENDING STATEMENT MOD
This was a shared visit with the MARTELL. I reviewed and verified the documentation and independently performed the documented:

## 2023-11-01 NOTE — H&P ADULT - NSHPLABSRESULTS_GEN_ALL_CORE
14.2   9.46  )-----------( 375      ( 2023 12:31 )             41.0     133<L>  |  94<L>  |  7   ----------------------------<  311<H>       3.9   |  24  |  0.45<L>    Ca    9.7      2023 12:31    TPro  7.3  /  Alb  4.8  /  TBili  0.5  /  DBili  x   /  AST  14  /  ALT  17  /  AlkPhos  94      PT/INR: 11.3/1.01 (23 @ 12:31)  PTT: 28.2 (23 @ 12:31)      17:15 - VBG - pH: 7.41  | pCO2: 43    | pO2: 50    | Lactate: 2.8    12:10 - VBG - pH: 7.40  | pCO2: 44    | pO2: 37    | Lactate: 2.9                Urinalysis Basic - ( 2023 12:10 )  Color: Yellow / Appearance: Clear / S.011 / pH: 6.5  Gluc: >=1000 mg/dL / Ketone: Negative mg/dL  / Bili: Negative / Urobili: 0.2 mg/dL   Blood: Negative / Protein: Negative mg/dL / Nitrite: Negative   Leuk Esterase: Negative / RBC: x / WBC x   Sq Epi: x / Non Sq Epi: x / Bacteria: x      Growth in anaerobic bottle: Gram Positive Rods   Direct identification is available within approximately 3-5   hours either by Blood Panel Multiplexed PCR or Direct       CXR film reviewed: Clear lungs

## 2023-11-01 NOTE — H&P ADULT - HISTORY OF PRESENT ILLNESS
62 y/o F with pmh of schizoaffective disorder, HTN, and DM type 2 presents to the ED after she was called back for positive blood cultures.  Pt initially presented to the ED 10/27 with fevers.  Patient reports she has had on and off fever and chills for the past month.  She also reports very poor appetite for the past week.  She reports no cough, , rhinorrhea, abd pain, or n/v/d.  Her only other complaint is sharp pain over LLE extending from hip down.  No back pain, LE weakness, or numbness. No history of recent back or LE injury.  No sick contacts.  Pt was discharged home from ED.  She reports continued subjective fevers and chills, and was called back for blood cultures positive for GPR.    In the ED today pt was given Zosyn and Tylenol.

## 2023-11-02 ENCOUNTER — TRANSCRIPTION ENCOUNTER (OUTPATIENT)
Age: 63
End: 2023-11-02

## 2023-11-02 DIAGNOSIS — E78.5 HYPERLIPIDEMIA, UNSPECIFIED: ICD-10-CM

## 2023-11-02 DIAGNOSIS — E11.65 TYPE 2 DIABETES MELLITUS WITH HYPERGLYCEMIA: ICD-10-CM

## 2023-11-02 DIAGNOSIS — M79.606 PAIN IN LEG, UNSPECIFIED: ICD-10-CM

## 2023-11-02 DIAGNOSIS — Z29.9 ENCOUNTER FOR PROPHYLACTIC MEASURES, UNSPECIFIED: ICD-10-CM

## 2023-11-02 DIAGNOSIS — I10 ESSENTIAL (PRIMARY) HYPERTENSION: ICD-10-CM

## 2023-11-02 LAB
ANION GAP SERPL CALC-SCNC: 13 MMOL/L — SIGNIFICANT CHANGE UP (ref 7–14)
ANION GAP SERPL CALC-SCNC: 13 MMOL/L — SIGNIFICANT CHANGE UP (ref 7–14)
BASOPHILS # BLD AUTO: 0.05 K/UL — SIGNIFICANT CHANGE UP (ref 0–0.2)
BASOPHILS # BLD AUTO: 0.05 K/UL — SIGNIFICANT CHANGE UP (ref 0–0.2)
BASOPHILS NFR BLD AUTO: 0.6 % — SIGNIFICANT CHANGE UP (ref 0–2)
BASOPHILS NFR BLD AUTO: 0.6 % — SIGNIFICANT CHANGE UP (ref 0–2)
BUN SERPL-MCNC: 5 MG/DL — LOW (ref 7–23)
BUN SERPL-MCNC: 5 MG/DL — LOW (ref 7–23)
CALCIUM SERPL-MCNC: 9.1 MG/DL — SIGNIFICANT CHANGE UP (ref 8.4–10.5)
CALCIUM SERPL-MCNC: 9.1 MG/DL — SIGNIFICANT CHANGE UP (ref 8.4–10.5)
CHLORIDE SERPL-SCNC: 100 MMOL/L — SIGNIFICANT CHANGE UP (ref 98–107)
CHLORIDE SERPL-SCNC: 100 MMOL/L — SIGNIFICANT CHANGE UP (ref 98–107)
CHOLEST SERPL-MCNC: 197 MG/DL — SIGNIFICANT CHANGE UP
CHOLEST SERPL-MCNC: 197 MG/DL — SIGNIFICANT CHANGE UP
CO2 SERPL-SCNC: 23 MMOL/L — SIGNIFICANT CHANGE UP (ref 22–31)
CO2 SERPL-SCNC: 23 MMOL/L — SIGNIFICANT CHANGE UP (ref 22–31)
CREAT SERPL-MCNC: 0.44 MG/DL — LOW (ref 0.5–1.3)
CREAT SERPL-MCNC: 0.44 MG/DL — LOW (ref 0.5–1.3)
CULTURE RESULTS: ABNORMAL
CULTURE RESULTS: ABNORMAL
CULTURE RESULTS: NO GROWTH — SIGNIFICANT CHANGE UP
CULTURE RESULTS: NO GROWTH — SIGNIFICANT CHANGE UP
EGFR: 108 ML/MIN/1.73M2 — SIGNIFICANT CHANGE UP
EGFR: 108 ML/MIN/1.73M2 — SIGNIFICANT CHANGE UP
EOSINOPHIL # BLD AUTO: 0.15 K/UL — SIGNIFICANT CHANGE UP (ref 0–0.5)
EOSINOPHIL # BLD AUTO: 0.15 K/UL — SIGNIFICANT CHANGE UP (ref 0–0.5)
EOSINOPHIL NFR BLD AUTO: 1.8 % — SIGNIFICANT CHANGE UP (ref 0–6)
EOSINOPHIL NFR BLD AUTO: 1.8 % — SIGNIFICANT CHANGE UP (ref 0–6)
GLUCOSE BLDC GLUCOMTR-MCNC: 203 MG/DL — HIGH (ref 70–99)
GLUCOSE BLDC GLUCOMTR-MCNC: 203 MG/DL — HIGH (ref 70–99)
GLUCOSE BLDC GLUCOMTR-MCNC: 226 MG/DL — HIGH (ref 70–99)
GLUCOSE BLDC GLUCOMTR-MCNC: 226 MG/DL — HIGH (ref 70–99)
GLUCOSE BLDC GLUCOMTR-MCNC: 228 MG/DL — HIGH (ref 70–99)
GLUCOSE BLDC GLUCOMTR-MCNC: 228 MG/DL — HIGH (ref 70–99)
GLUCOSE BLDC GLUCOMTR-MCNC: 235 MG/DL — HIGH (ref 70–99)
GLUCOSE BLDC GLUCOMTR-MCNC: 235 MG/DL — HIGH (ref 70–99)
GLUCOSE BLDC GLUCOMTR-MCNC: 287 MG/DL — HIGH (ref 70–99)
GLUCOSE BLDC GLUCOMTR-MCNC: 287 MG/DL — HIGH (ref 70–99)
GLUCOSE SERPL-MCNC: 242 MG/DL — HIGH (ref 70–99)
GLUCOSE SERPL-MCNC: 242 MG/DL — HIGH (ref 70–99)
HCT VFR BLD CALC: 37.1 % — SIGNIFICANT CHANGE UP (ref 34.5–45)
HCT VFR BLD CALC: 37.1 % — SIGNIFICANT CHANGE UP (ref 34.5–45)
HDLC SERPL-MCNC: 52 MG/DL — SIGNIFICANT CHANGE UP
HDLC SERPL-MCNC: 52 MG/DL — SIGNIFICANT CHANGE UP
HGB BLD-MCNC: 12.8 G/DL — SIGNIFICANT CHANGE UP (ref 11.5–15.5)
HGB BLD-MCNC: 12.8 G/DL — SIGNIFICANT CHANGE UP (ref 11.5–15.5)
IANC: 3.95 K/UL — SIGNIFICANT CHANGE UP (ref 1.8–7.4)
IANC: 3.95 K/UL — SIGNIFICANT CHANGE UP (ref 1.8–7.4)
IMM GRANULOCYTES NFR BLD AUTO: 0.2 % — SIGNIFICANT CHANGE UP (ref 0–0.9)
IMM GRANULOCYTES NFR BLD AUTO: 0.2 % — SIGNIFICANT CHANGE UP (ref 0–0.9)
LIPID PNL WITH DIRECT LDL SERPL: 125 MG/DL — HIGH
LIPID PNL WITH DIRECT LDL SERPL: 125 MG/DL — HIGH
LYMPHOCYTES # BLD AUTO: 3.56 K/UL — HIGH (ref 1–3.3)
LYMPHOCYTES # BLD AUTO: 3.56 K/UL — HIGH (ref 1–3.3)
LYMPHOCYTES # BLD AUTO: 42.3 % — SIGNIFICANT CHANGE UP (ref 13–44)
LYMPHOCYTES # BLD AUTO: 42.3 % — SIGNIFICANT CHANGE UP (ref 13–44)
MAGNESIUM SERPL-MCNC: 1.7 MG/DL — SIGNIFICANT CHANGE UP (ref 1.6–2.6)
MAGNESIUM SERPL-MCNC: 1.7 MG/DL — SIGNIFICANT CHANGE UP (ref 1.6–2.6)
MCHC RBC-ENTMCNC: 24.6 PG — LOW (ref 27–34)
MCHC RBC-ENTMCNC: 24.6 PG — LOW (ref 27–34)
MCHC RBC-ENTMCNC: 34.5 GM/DL — SIGNIFICANT CHANGE UP (ref 32–36)
MCHC RBC-ENTMCNC: 34.5 GM/DL — SIGNIFICANT CHANGE UP (ref 32–36)
MCV RBC AUTO: 71.3 FL — LOW (ref 80–100)
MCV RBC AUTO: 71.3 FL — LOW (ref 80–100)
MONOCYTES # BLD AUTO: 0.68 K/UL — SIGNIFICANT CHANGE UP (ref 0–0.9)
MONOCYTES # BLD AUTO: 0.68 K/UL — SIGNIFICANT CHANGE UP (ref 0–0.9)
MONOCYTES NFR BLD AUTO: 8.1 % — SIGNIFICANT CHANGE UP (ref 2–14)
MONOCYTES NFR BLD AUTO: 8.1 % — SIGNIFICANT CHANGE UP (ref 2–14)
NEUTROPHILS # BLD AUTO: 3.95 K/UL — SIGNIFICANT CHANGE UP (ref 1.8–7.4)
NEUTROPHILS # BLD AUTO: 3.95 K/UL — SIGNIFICANT CHANGE UP (ref 1.8–7.4)
NEUTROPHILS NFR BLD AUTO: 47 % — SIGNIFICANT CHANGE UP (ref 43–77)
NEUTROPHILS NFR BLD AUTO: 47 % — SIGNIFICANT CHANGE UP (ref 43–77)
NON HDL CHOLESTEROL: 145 MG/DL — HIGH
NON HDL CHOLESTEROL: 145 MG/DL — HIGH
NRBC # BLD: 0 /100 WBCS — SIGNIFICANT CHANGE UP (ref 0–0)
NRBC # BLD: 0 /100 WBCS — SIGNIFICANT CHANGE UP (ref 0–0)
NRBC # FLD: 0 K/UL — SIGNIFICANT CHANGE UP (ref 0–0)
NRBC # FLD: 0 K/UL — SIGNIFICANT CHANGE UP (ref 0–0)
ORGANISM # SPEC MICROSCOPIC CNT: ABNORMAL
PHOSPHATE SERPL-MCNC: 3.8 MG/DL — SIGNIFICANT CHANGE UP (ref 2.5–4.5)
PHOSPHATE SERPL-MCNC: 3.8 MG/DL — SIGNIFICANT CHANGE UP (ref 2.5–4.5)
PLATELET # BLD AUTO: 339 K/UL — SIGNIFICANT CHANGE UP (ref 150–400)
PLATELET # BLD AUTO: 339 K/UL — SIGNIFICANT CHANGE UP (ref 150–400)
POTASSIUM SERPL-MCNC: 3.7 MMOL/L — SIGNIFICANT CHANGE UP (ref 3.5–5.3)
POTASSIUM SERPL-MCNC: 3.7 MMOL/L — SIGNIFICANT CHANGE UP (ref 3.5–5.3)
POTASSIUM SERPL-SCNC: 3.7 MMOL/L — SIGNIFICANT CHANGE UP (ref 3.5–5.3)
POTASSIUM SERPL-SCNC: 3.7 MMOL/L — SIGNIFICANT CHANGE UP (ref 3.5–5.3)
RBC # BLD: 5.2 M/UL — SIGNIFICANT CHANGE UP (ref 3.8–5.2)
RBC # BLD: 5.2 M/UL — SIGNIFICANT CHANGE UP (ref 3.8–5.2)
RBC # FLD: 13.4 % — SIGNIFICANT CHANGE UP (ref 10.3–14.5)
RBC # FLD: 13.4 % — SIGNIFICANT CHANGE UP (ref 10.3–14.5)
SODIUM SERPL-SCNC: 136 MMOL/L — SIGNIFICANT CHANGE UP (ref 135–145)
SODIUM SERPL-SCNC: 136 MMOL/L — SIGNIFICANT CHANGE UP (ref 135–145)
SPECIMEN SOURCE: SIGNIFICANT CHANGE UP
TRIGL SERPL-MCNC: 99 MG/DL — SIGNIFICANT CHANGE UP
TRIGL SERPL-MCNC: 99 MG/DL — SIGNIFICANT CHANGE UP
WBC # BLD: 8.41 K/UL — SIGNIFICANT CHANGE UP (ref 3.8–10.5)
WBC # BLD: 8.41 K/UL — SIGNIFICANT CHANGE UP (ref 3.8–10.5)
WBC # FLD AUTO: 8.41 K/UL — SIGNIFICANT CHANGE UP (ref 3.8–10.5)
WBC # FLD AUTO: 8.41 K/UL — SIGNIFICANT CHANGE UP (ref 3.8–10.5)

## 2023-11-02 PROCEDURE — 72100 X-RAY EXAM L-S SPINE 2/3 VWS: CPT | Mod: 26

## 2023-11-02 PROCEDURE — 99223 1ST HOSP IP/OBS HIGH 75: CPT

## 2023-11-02 PROCEDURE — 99233 SBSQ HOSP IP/OBS HIGH 50: CPT

## 2023-11-02 RX ORDER — ATORVASTATIN CALCIUM 80 MG/1
20 TABLET, FILM COATED ORAL AT BEDTIME
Refills: 0 | Status: DISCONTINUED | OUTPATIENT
Start: 2023-11-02 | End: 2023-11-03

## 2023-11-02 RX ORDER — GABAPENTIN 400 MG/1
200 CAPSULE ORAL EVERY 8 HOURS
Refills: 0 | Status: DISCONTINUED | OUTPATIENT
Start: 2023-11-02 | End: 2023-11-03

## 2023-11-02 RX ORDER — INSULIN GLARGINE 100 [IU]/ML
12 INJECTION, SOLUTION SUBCUTANEOUS AT BEDTIME
Refills: 0 | Status: DISCONTINUED | OUTPATIENT
Start: 2023-11-02 | End: 2023-11-03

## 2023-11-02 RX ORDER — INSULIN LISPRO 100/ML
4 VIAL (ML) SUBCUTANEOUS
Refills: 0 | Status: DISCONTINUED | OUTPATIENT
Start: 2023-11-02 | End: 2023-11-03

## 2023-11-02 RX ORDER — LOSARTAN POTASSIUM 100 MG/1
25 TABLET, FILM COATED ORAL DAILY
Refills: 0 | Status: DISCONTINUED | OUTPATIENT
Start: 2023-11-02 | End: 2023-11-03

## 2023-11-02 RX ORDER — POLYETHYLENE GLYCOL 3350 17 G/17G
17 POWDER, FOR SOLUTION ORAL DAILY
Refills: 0 | Status: DISCONTINUED | OUTPATIENT
Start: 2023-11-02 | End: 2023-11-03

## 2023-11-02 RX ORDER — CITALOPRAM 10 MG/1
40 TABLET, FILM COATED ORAL DAILY
Refills: 0 | Status: DISCONTINUED | OUTPATIENT
Start: 2023-11-03 | End: 2023-11-03

## 2023-11-02 RX ORDER — ENOXAPARIN SODIUM 100 MG/ML
40 INJECTION SUBCUTANEOUS EVERY 24 HOURS
Refills: 0 | Status: DISCONTINUED | OUTPATIENT
Start: 2023-11-02 | End: 2023-11-03

## 2023-11-02 RX ORDER — INSULIN LISPRO 100/ML
VIAL (ML) SUBCUTANEOUS AT BEDTIME
Refills: 0 | Status: DISCONTINUED | OUTPATIENT
Start: 2023-11-02 | End: 2023-11-03

## 2023-11-02 RX ORDER — LIDOCAINE 4 G/100G
1 CREAM TOPICAL DAILY
Refills: 0 | Status: DISCONTINUED | OUTPATIENT
Start: 2023-11-02 | End: 2023-11-03

## 2023-11-02 RX ORDER — CITALOPRAM 10 MG/1
40 TABLET, FILM COATED ORAL ONCE
Refills: 0 | Status: COMPLETED | OUTPATIENT
Start: 2023-11-02 | End: 2023-11-02

## 2023-11-02 RX ADMIN — POLYETHYLENE GLYCOL 3350 17 GRAM(S): 17 POWDER, FOR SOLUTION ORAL at 11:15

## 2023-11-02 RX ADMIN — Medication 650 MILLIGRAM(S): at 07:42

## 2023-11-02 RX ADMIN — Medication 650 MILLIGRAM(S): at 01:30

## 2023-11-02 RX ADMIN — Medication 30 MILLILITER(S): at 22:00

## 2023-11-02 RX ADMIN — Medication 2: at 07:42

## 2023-11-02 RX ADMIN — CITALOPRAM 40 MILLIGRAM(S): 10 TABLET, FILM COATED ORAL at 13:45

## 2023-11-02 RX ADMIN — GABAPENTIN 200 MILLIGRAM(S): 400 CAPSULE ORAL at 13:45

## 2023-11-02 RX ADMIN — Medication 2: at 16:49

## 2023-11-02 RX ADMIN — Medication 650 MILLIGRAM(S): at 08:10

## 2023-11-02 RX ADMIN — Medication 3 MILLIGRAM(S): at 01:03

## 2023-11-02 RX ADMIN — Medication 4 UNIT(S): at 16:48

## 2023-11-02 RX ADMIN — LIDOCAINE 1 PATCH: 4 CREAM TOPICAL at 11:15

## 2023-11-02 RX ADMIN — Medication 3: at 11:16

## 2023-11-02 RX ADMIN — ATORVASTATIN CALCIUM 20 MILLIGRAM(S): 80 TABLET, FILM COATED ORAL at 22:00

## 2023-11-02 RX ADMIN — Medication 650 MILLIGRAM(S): at 01:03

## 2023-11-02 RX ADMIN — INSULIN GLARGINE 12 UNIT(S): 100 INJECTION, SOLUTION SUBCUTANEOUS at 22:02

## 2023-11-02 NOTE — CONSULT NOTE ADULT - ASSESSMENT
64 y/o F with pmh of schizoaffective disorder, HTN, and DM type 2 presents to the ED after she was called back for positive blood cultures. Endocrine consulted for diabetes management given A1c of 11.3.    Uncontrolled T2DM (at least 3 years--documented in discharge paperwork from 2020,Per PCP at last visit February 2023 A1c 13.2, eGFR 109, Urinalysis negative for proteinuria, no known opthalmologic/Neuropathy complications)  Patient noted to have A1c of 11.3 on ED labs, FS 300s  Endocrine team consulted for inpatient T2DM management.    Patient does not keep log of blood glucose levels.  Home Regimen Per Med Rec: 1000mg Metformin BID, glimepiride 4mg BID  Per PCP recommended Janumet, Semaglutide, Metformin, Simvastatin however unclear adherence     PCP: Dena Juan  Endocrinologist: None  Care Coordinator: Carolina  at [place] ([phone])    Patient currently not at goal, though limited data given no previous history of insulin, and insulin slide scale was started mid-day during admission.    #Uncontrolled T2DM  Inpatient:  - Start with 12U Lantus QHS  - Start with 4U Admelog TIDAC  (Hold if patient is NPO)  - Low/Low Correctional scale before meals and qhs  - Monitor FS before meals and qhs (Inpatient Goal 100-180 mg/dl)  - Consistent Carb Diet  - HbA1c Goal <8%  - No evidence of microalbumineruria on urinalaysis     Discharge Plan:   - Patient preference is to avoid insulin injections, would require education on adherence to multiple oral hypoglycemic agents versus basal/bolus regimen    #HTN:  - Currently ranging -140s (Goal <130/80)  -     #HLD:  - Please obtain Lipid Profile  - ASCVD risk pending lipid profile   62 y/o F with pmh of schizoaffective disorder, HTN, and DM type 2 presents to the ED after she was called back for positive blood cultures. Endocrine consulted for diabetes management given A1c of 11.3.    Uncontrolled T2DM (at least 3 years--documented in discharge paperwork from 2020,Per PCP at last visit February 2023 A1c 13.2, eGFR 109, Urinalysis negative for proteinuria, no known opthalmologic/Neuropathy complications)  Patient noted to have A1c of 11.3 on ED labs, FS 300s  Endocrine team consulted for inpatient T2DM management.    Patient does not keep log of blood glucose levels.  Home Regimen Per Med Rec: 1000mg Metformin BID, glimepiride 4mg BID  Per PCP; recommended Janumet, Semaglutide, Metformin, Simvastatin however unclear adherence     PCP: Dena Juan  Endocrinologist: None  Care Coordinator: Carolina  at [place] ([phone])    Patient currently not at goal, though limited data given no previous history of insulin, and insulin slide scale was started mid-day during admission.    #Uncontrolled T2DM  Inpatient:  - Agree with starting with 12U Lantus QHS  - Agree with starting with 4U Admelog TIDAC  (Hold if patient is NPO)  - Low/Low Correctional scale before meals and qhs  - Monitor FS before meals and qhs (Inpatient Goal 100-180 mg/dl)  - Consistent Carb Diet  - HbA1c Goal <8%  - RD/Nutrition consult for patient edu on proper diabetes diet       Discharge Plan:   - Ideal discharge would be basal injection plus oral agent, alternative regimen could be multiple oral agents daily plus weekly injections   - Patient preference is to avoid insulin injections, will continue conversation with patient and provide education to determine patient comfort/ability to complete injections  - Plan to obtain collateral from pharmacy/family on patient history of injections and medicine compliance   - Patient will require glucometer and test strips on discharge to monitor FS pre meal and QHS. Please provide supplies on discharge. Please provide pt edu on checking FS and creating log/record.    #HTN:  - Currently ranging -140s (Goal <130/80)  - Would recommend lose dose ARB/ACEi for renal protection and BP management    #HLD:  - Lipid Profile: Cholesterol 197, TG 99, HDL 52,   - ASCVD risk 21.8%, pt is high risk thus qualifying for statins  - Recommend starting atorvastatin 20mg QHS    Case discussed with attending and primary team addendum to follow.   64 y/o F with pmh of schizoaffective disorder, HTN, and DM type 2 presents to the ED after she was called back for positive blood cultures. Endocrine consulted for diabetes management given A1c of 11.3.    Uncontrolled T2DM (at least 3 years--documented in discharge paperwork from 2020,Per PCP at last visit February 2023 A1c 13.2, eGFR 109, Urinalysis negative for proteinuria, no known opthalmologic/Neuropathy complications)  Patient noted to have A1c of 11.3 on ED labs, FS 300s  Endocrine team consulted for inpatient T2DM management.    Patient does not keep log of blood glucose levels.  Home Regimen Per Med Rec: 1000mg Metformin BID, glimepiride 4mg BID  Per PCP; recommended Janumet, Semaglutide, Metformin, Simvastatin however unclear adherence     PCP: Dena Juan  Endocrinologist: None  Care Coordinator: Carolina  at [place] ([phone])    Patient currently not at goal, though limited data given no previous history of insulin, and insulin slide scale was started mid-day during admission.    #Uncontrolled T2DM  Inpatient:  - Agree with starting with 12U Lantus QHS  - Agree with starting with 4U Admelog TIDAC  (Hold if patient is NPO)  - Low/Low Correctional scale before meals and qhs  - Monitor FS before meals and qhs (Inpatient Goal 100-180 mg/dl)  - Consistent Carb Diet  - HbA1c Goal <8%  - RD/Nutrition consult for patient edu on proper diabetes diet   - Please obtain standing weight and height for updated accurate BMI measurements       Discharge Plan:   - Ideal discharge would be basal injection plus oral agent, alternative regimen could be multiple oral agents daily plus weekly injections   - Patient preference is to avoid insulin injections, will continue conversation with patient and provide education to determine patient comfort/ability to complete injections  - Plan to obtain collateral from pharmacy/family on patient history of injections and medicine compliance   - Patient will require glucometer and test strips on discharge to monitor FS pre meal and QHS. Please provide supplies on discharge. Please provide pt edu on checking FS and creating log/record.    #HTN:  - Currently ranging -140s (Goal <130/80)  - Would recommend lose dose ARB/ACEi for renal protection and BP management    #HLD:  - Lipid Profile: Cholesterol 197, TG 99, HDL 52,   - ASCVD risk 21.8%, pt is high risk thus qualifying for statins  - Recommend starting atorvastatin 20mg QHS    Case discussed with attending and primary team addendum to follow.

## 2023-11-02 NOTE — DISCHARGE NOTE PROVIDER - NSDCMRMEDTOKEN_GEN_ALL_CORE_FT
albuterol 90 mcg/inh inhalation aerosol: 2 puff(s) inhaled every 6 hours, As needed, Shortness of Breath and/or Wheezing  gabapentin 100 mg oral capsule: 1 cap(s) orally every 8 hours  glimepiride 4 mg oral tablet: 1 tab(s) orally 2 times a day  haloperidol decanoate 100 mg/mL intramuscular solution: 1 milliliter(s) intramuscular every 4 weeks  metFORMIN 1000 mg oral tablet: 1 tab(s) orally 2 times a day (with meals)  metFORMIN 1000 mg oral tablet: 1 tab(s) orally 2 times a day  senna oral tablet: 2 tab(s) orally once a day (at bedtime)   albuterol 90 mcg/inh inhalation aerosol: 2 puff(s) inhaled every 6 hours, As needed, Shortness of Breath and/or Wheezing  glimepiride 4 mg oral tablet: 1 tab(s) orally 2 times a day  haloperidol decanoate 100 mg/mL intramuscular solution: 1 milliliter(s) intramuscular every 4 weeks  Janumet 50 mg-1000 mg oral tablet: 1 tab(s) orally 2 times a day  Jardiance 10 mg oral tablet: 1 tab(s) orally once a day  metFORMIN 1000 mg oral tablet: 1 tab(s) orally 2 times a day  metFORMIN 1000 mg oral tablet: 1 tab(s) orally 2 times a day (with meals)  senna oral tablet: 2 tab(s) orally once a day (at bedtime)   atorvastatin 20 mg oral tablet: 1 tab(s) orally once a day (at bedtime)  citalopram 40 mg oral tablet: 1 tab(s) orally once a day  Freestyle Cristy 2 Shelbiana: Dispense 1 Shelbiana  Freestyle Cristy 2 Sensors: Apply 1 sensor every 14 days  gabapentin 100 mg oral capsule: 2 cap(s) orally every 8 hours  haloperidol decanoate 100 mg/mL intramuscular solution: 1 milliliter(s) intramuscular every 4 weeks  Janumet 50 mg-1000 mg oral tablet: 1 tab(s) orally 2 times a day  Jardiance 10 mg oral tablet: 1 tab(s) orally once a day  lidocaine 4% topical film: Apply topically to affected area once a day 12 hrs on, 12 hrs off  losartan 25 mg oral tablet: 1 tab(s) orally once a day  senna oral tablet: 2 tab(s) orally once a day (at bedtime)

## 2023-11-02 NOTE — PATIENT PROFILE ADULT - FALL HARM RISK - HARM RISK INTERVENTIONS

## 2023-11-02 NOTE — PROGRESS NOTE ADULT - PROBLEM SELECTOR PLAN 2
L>R, whole leg, no trauma, no limiting ambulating, no back in back, no changes in sensation  - home gabapentin 100 mg TID, increase to 200 mg TID  - L spine x-ray

## 2023-11-02 NOTE — PROGRESS NOTE ADULT - PROBLEM SELECTOR PLAN 3
HbA1c is 11.3%, on metformin 1000 mg BID at home   - c/w EDOUARD  - add lantus 12 units and 4 units with meals  - states would not be able to do insulin at home, asking for more pills  - DM diet  - RD consult  - tanner jones

## 2023-11-02 NOTE — PROGRESS NOTE ADULT - PROBLEM SELECTOR PLAN 1
GPR in 1/4 bottles possibly represent contaminant from skin tk.  Pt non-toxic appearing.  No fevers here.  None noted in ED on 10/27 either.  Denies rashes, diarrhea, dysuria, cough.   - monitor off abx  - f/u final speciation  - f/u repeat cultures taken in ED GPR in 1/4 bottles possibly represent contaminant from skin tk.  Pt non-toxic appearing.  No fevers here.  None noted in ED on 10/27 either.  Denies rashes, diarrhea, dysuria, cough.   - monitor off abx  - f/u final speciation---called [facility] today, state it took 5 days to grown only grew on 11/1, was checked but no growth yet, will check again every 24h x 5 days, other cx that day NG  - f/u repeat cultures taken in ED, f/u

## 2023-11-02 NOTE — DISCHARGE NOTE PROVIDER - HOSPITAL COURSE
64 y/o F with pmh of schizoaffective disorder (haldol dec last does 10/19, follows at [facility]), HTN, and DM type 2 presents to the ED after she was called back for positive blood cultures.  Pt initially presented to the ED 10/27 with fevers.  Patient reports she has had on and off fever and chills for the past month.  She also reports very poor appetite for the past week.  She reports no cough, , rhinorrhea, abd pain, or n/v/d.  Her only other complaint is sharp pain over LLE extending from hip down.  No back pain, LE weakness, or numbness. No history of recent back or LE injury.  No sick contacts.  Pt was discharged home from ED.  She reports continued subjective fevers and chills, and was called back for blood cultures positive for GPR.  In the ED today pt was given Zosyn and Tylenol.     Hospital Course:  Admitted to medicine 7S.  Concern that blood cx is contaminant given 1/4 bottles, GPR pending speciation, abx held.  Repeat blood cultures showed----.   Patient noted to have uncontrolled DM2 with HbA1c 11.3% and hyperglycemia, seen by endocrine and recommend ---.   For leg pain, no trauma, no alarm sx, whole leg, comes and goes, no weakness able to ambulate unassisted, lumbar x-ray showed---.  Home gabapentin increased.    Dc home 62 y/o F with pmh of schizoaffective disorder (haldol dec last does 10/19, follows at [facility]), HTN, and DM type 2 presented to the ED after she was called back for positive blood cultures.  Pt initially presented to the ED 10/27 with fevers.  Patient reports she has had on and off fever and chills for the past month.  She also reports very poor appetite for the past week.  She reports no cough, , rhinorrhea, abd pain, or n/v/d.  Her only other complaint is sharp pain over LLE extending from hip down.  No back pain, LE weakness, or numbness. No history of recent back or LE injury.  No sick contacts.  Pt was discharged home from ED.  She reports continued subjective fevers and chills, and was called back for blood cultures positive for GPR.  In the ED today pt was given Zosyn and Tylenol.     Hospital Course:  Admitted to medicine 7S.  Concern that blood cx is contaminant given 1/4 bottles, GPR pending speciation, abx held.  Repeat blood cultures showed----.   Patient noted to have uncontrolled DM2 with HbA1c 11.3% and hyperglycemia, seen by endocrine and recommend ---.   For leg pain, no trauma, no alarm sx, whole leg, comes and goes, no weakness able to ambulate unassisted, lumbar x-ray showed---.  Home gabapentin increased.    Dc home 64 y/o F with pmh of schizoaffective disorder (haldol dec last does 10/19, follows at [facility]), HTN, and DM type 2 presented to the ED after she was called back for positive blood cultures.  Pt initially presented to the ED 10/27 with fevers.  Patient reports she has had on and off fever and chills for the past month.  She also reports very poor appetite for the past week.  She reports no cough, , rhinorrhea, abd pain, or n/v/d.  Her only other complaint is sharp pain over LLE extending from hip down.  No back pain, LE weakness, or numbness. No history of recent back or LE injury.  No sick contacts.  Pt was discharged home from ED.  She reports continued subjective fevers and chills, and was called back for blood cultures positive for GPR.  In the ED today pt was given Zosyn and Tylenol.     Hospital Course:  Admitted to medicine 7S.  Concern that blood cx is contaminant given 1/4 bottles took 5 days to grown and on plates not growing per lab, abx held.  Repeat blood cultures showed NGTD. Patient noted to have uncontrolled DM2 with HbA1c 11.3% and hyperglycemia, seen by endocrine and recommend Janumet and Jardiance. For leg pain, no trauma, no alarm sx, whole leg, comes and goes, no weakness able to ambulate unassisted, lumbar x-ray showed no issues. Home gabapentin increased.    Dc home  OP PCP f/u, advised it is walkin thus no appt needed, pt advised to f/u next week

## 2023-11-02 NOTE — CONSULT NOTE ADULT - SUBJECTIVE AND OBJECTIVE BOX
PGY-1 Federica Hussein  Endocrine Consult Service     NOTE INCOMPLETE/ IN PROGRESS    HPI: Obtained via chart review. Confirmed with patient at bedside.     62 y/o F with pmh of schizoaffective disorder, HTN, and DM type 2 presents to the ED after she was called back for positive blood cultures.  Pt initially presented to the ED 10/27 with fevers.  Patient reports she has had on and off fever and chills for the past month.  She also reports very poor appetite for the past week.  She reports no cough, , rhinorrhea, abd pain, or n/v/d.  Her only other complaint is sharp pain over LLE extending from hip down.  No back pain, LE weakness, or numbness. No history of recent back or LE injury.  No sick contacts.  Pt was discharged home from ED.  She reports continued subjective fevers and chills, and was called back for blood cultures positive for GPR.    In the ED today pt was given Zosyn and Tylenol.     Consulted for: Diabetes management, A1C 11.3    PAST MEDICAL & SURGICAL HISTORY:  Schizoaffective disorder  Diabetes  HTN (hypertension)  Anemia    No significant past surgical history    FAMILY HISTORY:  FH: heart disease (Mother)    Social History: lives with sister and mother, reportedly independent but does has a care coordinator to help in going to appointments.     Outpatient Medications:    MEDICATIONS  (STANDING):  dextrose 50% Injectable 12.5 Gram(s) IV Push once  dextrose 50% Injectable 25 Gram(s) IV Push once  dextrose 50% Injectable 25 Gram(s) IV Push once  enoxaparin Injectable 40 milliGRAM(s) SubCutaneous every 24 hours  gabapentin 200 milliGRAM(s) Oral every 8 hours  insulin glargine Injectable (LANTUS) 12 Unit(s) SubCutaneous at bedtime  insulin lispro (ADMELOG) corrective regimen sliding scale   SubCutaneous at bedtime  insulin lispro (ADMELOG) corrective regimen sliding scale   SubCutaneous three times a day before meals  insulin lispro Injectable (ADMELOG) 4 Unit(s) SubCutaneous three times a day before meals  lidocaine   4% Patch 1 Patch Transdermal daily  polyethylene glycol 3350 17 Gram(s) Oral daily    MEDICATIONS  (PRN):  dextrose Oral Gel 15 Gram(s) Oral once PRN Blood Glucose LESS THAN 70 milliGRAM(s)/deciliter  melatonin 3 milliGRAM(s) Oral at bedtime PRN Insomnia      Allergies    No Known Allergies    Intolerances      Review of Systems:  Constitutional: Shakes/Subjective Fevers for 1 month  Eyes: No blurry vision  Neuro: No tremors  HEENT: No pain  Cardiovascular: No chest pain, palpitations  Respiratory: No SOB, no cough  GI: No nausea, vomiting, abdominal pain  : No dysuria  Psych: No significant mood changes/swings  Endocrine: no polyuria, polydipsia    PHYSICAL EXAM:  VITALS: T(C): 36.6 (11-02-23 @ 11:16)  T(F): 97.8 (11-02-23 @ 11:16), Max: 99.5 (11-01-23 @ 16:41)  HR: 90 (11-02-23 @ 11:16) (90 - 116)  BP: 145/78 (11-02-23 @ 11:16) (124/64 - 145/79)  RR:  (16 - 18)  SpO2:  (98% - 100%)  Wt(kg): 67.2    GENERAL: NAD, well-groomed, well-developed  EYES: No proptosis, no lid lag, anicteric  HEENT:  Atraumatic, Normocephalic, moist mucous membranes  THYROID: Normal size, no palpable nodules  RESPIRATORY: Clear to auscultation bilaterally; No rales, rhonchi, wheezing  CARDIOVASCULAR: Regular rhythm; Tachycardia, No murmurs; no peripheral edema  GI: Soft, nontender, non distended, normal bowel sounds  SKIN: Dry, intact, No rashes or lesions  MUSCULOSKELETAL: Spontaneous movement of all extremities  NEURO: sensation intact, no focal deficits appreciated   PSYCH: Alert and oriented x 3, normal affect, normal mood    CAPILLARY BLOOD GLUCOSE  POCT Blood Glucose.: 287 mg/dL (02 Nov 2023 10:59)  POCT Blood Glucose.: 228 mg/dL (02 Nov 2023 07:28)  POCT Blood Glucose.: 203 mg/dL (02 Nov 2023 00:27)  POCT Blood Glucose.: 201 mg/dL (01 Nov 2023 23:07)  POCT Blood Glucose.: 289 mg/dL (01 Nov 2023 19:54)  POCT Blood Glucose.: 262 mg/dL (01 Nov 2023 17:23)                            12.8   8.41  )-----------( 339      ( 02 Nov 2023 04:16 )             37.1       11-02    136  |  100  |  5<L>  ----------------------------<  242<H>  3.7   |  23  |  0.44<L>    eGFR: 108    Ca    9.1      11-02  Mg     1.70     11-02  Phos  3.8     11-02    TPro  7.3  /  Alb  4.8  /  TBili  0.5  /  DBili  x   /  AST  14  /  ALT  17  /  AlkPhos  94  11-01      Thyroid Function Tests:      A1C with Estimated Average Glucose Result: 11.3 % (10-27-23 @ 03:00)      Radiology:   < from: Xray Chest 2 Views PA/Lat (11.01.23 @ 12:48) >  ACC: [id number] EXAM:  XR CHEST PA LAT 2V   ORDERED BY: NILAM NUGENT     PROCEDURE DATE:  11/01/2023      INTERPRETATION:  CLINICAL INFORMATION: Fever    TIME OF EXAMINATION: November 1, 2023 at 12:47 PM    EXAM: PA and Lateral Chest    FINDINGS:  Examination in frontal and lateral projection fails to show   evidence of any active pulmonary disease.  The heart is not enlarged and   there is no pleural effusion or pneumothorax.    There is no acute bone pathology.    COMPARISON: April 3, 2020    IMPRESSION: Clear lungs.    --- End of Report ---    < end of copied text >    Culture Results:   10,000 - 49,000 CFU/mL Streptococcus agalactiae (Group B) isolated   Group B streptococci are susceptible to ampicillin,   penicillin and cefazolin, but may be resistant to   erythromycin and clindamycin.   <10,000 CFU/ml Normal Urogenital tk present (10.27.23 @ 03:11)     Culture Results:   Growth in anaerobic bottle: Gram Positive Rods   Direct identification is available within approximately 3-5   hours either by Blood Panel Multiplexed PCR or Direct   MALDI-TOF. Details: [email or web address] (10.27.23 @ 03:00) - Blood PCR Panel: NEG (10.27.23 @ 03:00)   Blood PCR negative         PGY-1 Federica Hussein  Endocrine Consult Service     HPI: Obtained via chart review. Confirmed with patient at bedside.     64 y/o F with pmh of schizoaffective disorder, HTN, and DM type 2 presents to the ED after she was called back for positive blood cultures.  Pt initially presented to the ED 10/27 with fevers.  Patient reports she has had on and off fever and chills for the past month.  She also reports very poor appetite for the past week.  She reports no cough, , rhinorrhea, abd pain, or n/v/d.  Her only other complaint is sharp pain over LLE extending from hip down.  No back pain, LE weakness, or numbness. No history of recent back or LE injury.  No sick contacts.  Pt was discharged home from ED.  She reports continued subjective fevers and chills, and was called back for blood cultures positive for GPR.    In the ED today pt was given Zosyn and Tylenol.     At bedside patient was awake and alert, engaged in conversation. Per interview patient noted that she has had diabetes for several years and that a doctor diagnosed her but she could not remember when or how. Patient stated that she is on two pills but was having trouble remembering name and dosing, states she uses a pill box to keep her medications organized. She does not remember having any issues from the diabetes but was unsure noting a care provider comes with her to some of her appointments and helps with specific details. When asked about insulin injections that patient stated she dislikes needles and did not feel comfortable using them, but was okay with nursing staff giving her insulin injections while here in the hospital. Of not patient states she has lost some weight over last month but has not needed to change clothes and could not give specific amount of weight.     Outside of subjective fevers and chills, patient denied any CP, SOB, N/V/D, dysuria, urinary frequency. Last BM approximately 2 days ago, took medication for stool but has yet to have BM. Patient otherwise feeling well.     Consulted for: Diabetes management, A1C 11.3    PAST MEDICAL & SURGICAL HISTORY:  Schizoaffective disorder  Diabetes  HTN (hypertension)  Anemia    No significant past surgical history    FAMILY HISTORY:  FH: heart disease (Mother)    Social History: lives with sister and mother, reportedly independent but does has a care coordinator to help in going to appointments.     Outpatient Medications:    MEDICATIONS  (STANDING):  dextrose 50% Injectable 12.5 Gram(s) IV Push once  dextrose 50% Injectable 25 Gram(s) IV Push once  dextrose 50% Injectable 25 Gram(s) IV Push once  enoxaparin Injectable 40 milliGRAM(s) SubCutaneous every 24 hours  gabapentin 200 milliGRAM(s) Oral every 8 hours  insulin glargine Injectable (LANTUS) 12 Unit(s) SubCutaneous at bedtime  insulin lispro (ADMELOG) corrective regimen sliding scale   SubCutaneous at bedtime  insulin lispro (ADMELOG) corrective regimen sliding scale   SubCutaneous three times a day before meals  insulin lispro Injectable (ADMELOG) 4 Unit(s) SubCutaneous three times a day before meals  lidocaine   4% Patch 1 Patch Transdermal daily  polyethylene glycol 3350 17 Gram(s) Oral daily    MEDICATIONS  (PRN):  dextrose Oral Gel 15 Gram(s) Oral once PRN Blood Glucose LESS THAN 70 milliGRAM(s)/deciliter  melatonin 3 milliGRAM(s) Oral at bedtime PRN Insomnia      Allergies    No Known Allergies    Intolerances      Review of Systems:  Constitutional: Shakes/Subjective Fevers for 1 month  Eyes: No blurry vision  Neuro: No tremors  HEENT: No pain  Cardiovascular: No chest pain, palpitations  Respiratory: No SOB, no cough  GI: No nausea, vomiting, abdominal pain  : No dysuria  Psych: No significant mood changes/swings  Endocrine: no polyuria, polydipsia    PHYSICAL EXAM:  VITALS: T(C): 36.6 (11-02-23 @ 11:16)  T(F): 97.8 (11-02-23 @ 11:16), Max: 99.5 (11-01-23 @ 16:41)  HR: 90 (11-02-23 @ 11:16) (90 - 116)  BP: 145/78 (11-02-23 @ 11:16) (124/64 - 145/79)  RR:  (16 - 18)  SpO2:  (98% - 100%)  Wt(kg): 67.2    GENERAL: NAD, well-groomed, well-developed  EYES: No proptosis, no lid lag, anicteric  HEENT:  Atraumatic, Normocephalic, moist mucous membranes  THYROID: Normal size, no palpable nodules  RESPIRATORY: Clear to auscultation bilaterally; No rales, rhonchi, wheezing  CARDIOVASCULAR: Regular rhythm; Tachycardia, No murmurs; no peripheral edema  GI: Soft, nontender, non distended, normal bowel sounds  SKIN: Dry, intact, No rashes or lesions  MUSCULOSKELETAL: Spontaneous movement of all extremities  NEURO: sensation intact, no focal deficits appreciated   PSYCH: Alert and oriented x 3, normal affect, normal mood    CAPILLARY BLOOD GLUCOSE  POCT Blood Glucose.: 287 mg/dL (02 Nov 2023 10:59)  POCT Blood Glucose.: 228 mg/dL (02 Nov 2023 07:28)  POCT Blood Glucose.: 203 mg/dL (02 Nov 2023 00:27)  POCT Blood Glucose.: 201 mg/dL (01 Nov 2023 23:07)  POCT Blood Glucose.: 289 mg/dL (01 Nov 2023 19:54)  POCT Blood Glucose.: 262 mg/dL (01 Nov 2023 17:23)                            12.8   8.41  )-----------( 339      ( 02 Nov 2023 04:16 )             37.1       11-02    136  |  100  |  5<L>  ----------------------------<  242<H>  3.7   |  23  |  0.44<L>    eGFR: 108    Ca    9.1      11-02  Mg     1.70     11-02  Phos  3.8     11-02    TPro  7.3  /  Alb  4.8  /  TBili  0.5  /  DBili  x   /  AST  14  /  ALT  17  /  AlkPhos  94  11-01      Thyroid Function Tests:      A1C with Estimated Average Glucose Result: 11.3 % (10-27-23 @ 03:00)      Radiology:   < from: Xray Chest 2 Views PA/Lat (11.01.23 @ 12:48) >  ACC: [id number] EXAM:  XR CHEST PA LAT 2V   ORDERED BY: NILAM BARBOZA DATE:  11/01/2023      INTERPRETATION:  CLINICAL INFORMATION: Fever    TIME OF EXAMINATION: November 1, 2023 at 12:47 PM    EXAM: PA and Lateral Chest    FINDINGS:  Examination in frontal and lateral projection fails to show   evidence of any active pulmonary disease.  The heart is not enlarged and   there is no pleural effusion or pneumothorax.    There is no acute bone pathology.    COMPARISON: April 3, 2020    IMPRESSION: Clear lungs.    --- End of Report ---    < end of copied text >    Culture Results:   10,000 - 49,000 CFU/mL Streptococcus agalactiae (Group B) isolated   Group B streptococci are susceptible to ampicillin,   penicillin and cefazolin, but may be resistant to   erythromycin and clindamycin.   <10,000 CFU/ml Normal Urogenital tk present (10.27.23 @ 03:11)     Culture Results:   Growth in anaerobic bottle: Gram Positive Rods   Direct identification is available within approximately 3-5   hours either by Blood Panel Multiplexed PCR or Direct   MALDI-TOF. Details: [email or web address] (10.27.23 @ 03:00) - Blood PCR Panel: NEG (10.27.23 @ 03:00)   Blood PCR negative

## 2023-11-02 NOTE — DISCHARGE NOTE PROVIDER - NSDCCPCAREPLAN_GEN_ALL_CORE_FT
PRINCIPAL DISCHARGE DIAGNOSIS  Diagnosis: Bacteremia  Assessment and Plan of Treatment:       SECONDARY DISCHARGE DIAGNOSES  Diagnosis: Type 2 diabetes mellitus  Assessment and Plan of Treatment: Take you medications and follow-up with primary care.    Diagnosis: Schizoaffective disorder  Assessment and Plan of Treatment: Take you medications and follow-up with psychiatry.    Diagnosis: Leg pain  Assessment and Plan of Treatment: Take you medications and follow-up with primary care.     PRINCIPAL DISCHARGE DIAGNOSIS  Diagnosis: Bacteremia  Assessment and Plan of Treatment: You were called back from the hospital due to positive blood culture. As it took more then 5 days to grow, and as per lab no further growth is seen, it was likely a contaminant. Repeat cultures were done which were negative at this time. You were also fever free while admitted here. Please continue to monitor any signs of symptoms: fever, chills, weakness, altered mental status, etc.      SECONDARY DISCHARGE DIAGNOSES  Diagnosis: Leg pain  Assessment and Plan of Treatment: Your gabapentin dosage was increased. Please continue with current dosage as it can take few days for the increase in dosage to show changes in your symptoms. There are still rooms to titrate up as needed. Take you medications and follow-up with primary care.    Diagnosis: Type 2 diabetes mellitus  Assessment and Plan of Treatment: You have history of diabetes and your sugar levels were seen to be pretty high while you were here. Your hemoglobin A1C was seen to be >11 which menas your sugars were not well managed. You were seen by the endocrine doctor in the hospital. Ideally it is recommended for you to go on inuslin but you have refused insulin or injection treatments at this time. Please continue with current regimen as prescribed and follow up with your primary care provider within 1 week of discharge for further management and monitoring. PCP was called and they reported that they don't do appointments but you can walk in.    Diagnosis: Schizoaffective disorder  Assessment and Plan of Treatment: Take you medications and follow-up with psychiatry.     PRINCIPAL DISCHARGE DIAGNOSIS  Diagnosis: Bacteremia  Assessment and Plan of Treatment: You were called back from the hospital due to positive blood culture. As it took more then 5 days to grow, and as per lab no further growth is seen, it was likely a contaminant. Repeat cultures were done which were negative at this time. You were also fever free while admitted here. Please continue to monitor any signs of symptoms: fever, chills, weakness, altered mental status, etc.      SECONDARY DISCHARGE DIAGNOSES  Diagnosis: Type 2 diabetes mellitus  Assessment and Plan of Treatment: You have history of diabetes and your sugar levels were seen to be pretty high while you were here. Your hemoglobin A1C was seen to be >11 which menas your sugars were not well managed. You were seen by the endocrine doctor in the hospital. Ideally it is recommended for you to go on inuslin but you have refused insulin or injection treatments at this time. Please continue with current regimen as prescribed and follow up with your primary care provider within 1 week of discharge for further management and monitoring. PCP was called and they reported that they don't do appointments but you can walk in next week.    Diagnosis: Leg pain  Assessment and Plan of Treatment: Your back x-ray was normal and we did not see any issues on your leg. Your gabapentin dosage was increased. Please continue with current dosage as it can take few days for the increase in dosage to show changes in your symptoms. There are still rooms to titrate up as needed. Take you medications and follow-up with primary care.    Diagnosis: Schizoaffective disorder  Assessment and Plan of Treatment: Take you medications and follow-up with psychiatry.     PRINCIPAL DISCHARGE DIAGNOSIS  Diagnosis: Bacteremia  Assessment and Plan of Treatment: You were called back from the hospital due to positive blood culture. As it took more then 5 days to grow, and as per lab no further growth is seen, it was likely a contaminant. Repeat cultures were done which were negative at this time. You were also fever free while admitted here. Please continue to monitor any signs of symptoms: fever, chills, weakness, altered mental status, etc.      SECONDARY DISCHARGE DIAGNOSES  Diagnosis: Leg pain  Assessment and Plan of Treatment: Your back x-ray was normal and we did not see any issues on your leg. Your gabapentin dosage was increased. Please continue with current dosage as it can take few days for the increase in dosage to show changes in your symptoms. There are still rooms to titrate up as needed. Take you medications and follow-up with primary care.    Diagnosis: Type 2 diabetes mellitus  Assessment and Plan of Treatment: You have history of diabetes and your sugar levels were seen to be pretty high while you were here. Your hemoglobin A1C was seen to be >11 which menas your sugars were not well managed. You were seen by the endocrine doctor in the hospital. Ideally it is recommended for you to go on inuslin but you have refused insulin or injection treatments at this time. Please STOP taking metformin. You have new medications: Janumet 50/1000 2 times a day (combo pill) and Jardiance 10mg daily. Cristy machine (checks your glucose) is also fully covered along with its reader. Please pick it up at your pharmacy and bring it to your primary care provider to get it placed on. Sensor lasts for 10 days so you have to get it replaced every 10 days but this will also help to easily check your sugar level without poking your fingers >2 times da day. Please continue with current regimen as prescribed and follow up with your primary care provider within 1 week of discharge for further management and monitoring. PCP was called and they reported that they don't do appointments but you can walk in next week.    Diagnosis: Schizoaffective disorder  Assessment and Plan of Treatment: Take you medications and follow-up with psychiatry.     PRINCIPAL DISCHARGE DIAGNOSIS  Diagnosis: Bacteremia  Assessment and Plan of Treatment: You were called back from the hospital due to positive blood culture. As it took more then 5 days to grow, and as per lab no further growth is seen, it was likely a contaminant. Repeat cultures were done which were negative at this time. You were also fever free while admitted here. Please continue to monitor any signs of symptoms: fever, chills, weakness, altered mental status, etc.      SECONDARY DISCHARGE DIAGNOSES  Diagnosis: Leg pain  Assessment and Plan of Treatment: Your back x-ray was normal and we did not see any issues on your leg. Your gabapentin dosage was increased. Please continue with current dosage as it can take few days for the increase in dosage to show changes in your symptoms. There are still rooms to titrate up as needed. Take you medications and follow-up with primary care.    Diagnosis: Type 2 diabetes mellitus  Assessment and Plan of Treatment: You have history of diabetes and your sugar levels were seen to be pretty high while you were here. Your hemoglobin A1C was seen to be >11 which menas your sugars were not well managed. You were seen by the endocrine doctor in the hospital. Ideally it is recommended for you to go on inuslin but you have refused insulin or injection treatments at this time. Please STOP taking metformin. You have new medications: Janumet 50/1000 2 times a day (combo pill) and Jardiance 10mg daily. Cristy machine (checks your glucose) is also fully covered along with its reader. Please pick it up at your pharmacy and bring it to your primary care provider to get it placed on. Sensor lasts for 10 days so you have to get it replaced every 10 days but this will also help to easily check your sugar level without poking your fingers >2 times da day. Please continue with current regimen as prescribed and follow up with your primary care provider within 1 week of discharge for further management and monitoring. PCP was called and they reported that they don't do appointments but you can walk in next week.    Diagnosis: Hyperlipidemia  Assessment and Plan of Treatment: Your lipid level was found to be elevated so you were started on atorvastatin and also your blood pressure was on the higher side so losartan was started. Losartan not only helps to control your blood sugar better but it also helps to protect your kidney from your diabetes. Please continue to take the medications as prescribed and follow up with your primary care provider for further management and montioring.    Diagnosis: Schizoaffective disorder  Assessment and Plan of Treatment: Take you medications and follow-up with psychiatry.

## 2023-11-02 NOTE — CONSULT NOTE ADULT - ATTENDING COMMENTS
63F uncontrolled DM2 HBA1c 11.3%, reports taking only metformin. Refuses to consider injectable meds for discharge. Will plan to optimize oral regimen.    Kenia Candelario MD  Division of Endocrinology  Pager: [phone]    If after 6PM or before 9AM, or on weekends/holidays, please call endocrine answering service for assistance ([phone]).  For nonurgent matters email [email or web address] for assistance.

## 2023-11-03 ENCOUNTER — TRANSCRIPTION ENCOUNTER (OUTPATIENT)
Age: 63
End: 2023-11-03

## 2023-11-03 VITALS
RESPIRATION RATE: 17 BRPM | DIASTOLIC BLOOD PRESSURE: 62 MMHG | HEART RATE: 88 BPM | OXYGEN SATURATION: 99 % | SYSTOLIC BLOOD PRESSURE: 141 MMHG | TEMPERATURE: 99 F

## 2023-11-03 LAB
GLUCOSE BLDC GLUCOMTR-MCNC: 205 MG/DL — HIGH (ref 70–99)
GLUCOSE BLDC GLUCOMTR-MCNC: 205 MG/DL — HIGH (ref 70–99)
GLUCOSE BLDC GLUCOMTR-MCNC: 219 MG/DL — HIGH (ref 70–99)
GLUCOSE BLDC GLUCOMTR-MCNC: 219 MG/DL — HIGH (ref 70–99)
GLUCOSE BLDC GLUCOMTR-MCNC: 228 MG/DL — HIGH (ref 70–99)
GLUCOSE BLDC GLUCOMTR-MCNC: 228 MG/DL — HIGH (ref 70–99)
GLUCOSE BLDC GLUCOMTR-MCNC: 243 MG/DL — HIGH (ref 70–99)
GLUCOSE BLDC GLUCOMTR-MCNC: 243 MG/DL — HIGH (ref 70–99)

## 2023-11-03 PROCEDURE — 99239 HOSP IP/OBS DSCHRG MGMT >30: CPT

## 2023-11-03 PROCEDURE — 99232 SBSQ HOSP IP/OBS MODERATE 35: CPT

## 2023-11-03 RX ORDER — CITALOPRAM 10 MG/1
1 TABLET, FILM COATED ORAL
Refills: 0 | DISCHARGE

## 2023-11-03 RX ORDER — EMPAGLIFLOZIN 10 MG/1
1 TABLET, FILM COATED ORAL
Qty: 30 | Refills: 0
Start: 2023-11-03 | End: 2023-12-02

## 2023-11-03 RX ORDER — GABAPENTIN 400 MG/1
2 CAPSULE ORAL
Qty: 180 | Refills: 0
Start: 2023-11-03 | End: 2023-12-02

## 2023-11-03 RX ORDER — ATORVASTATIN CALCIUM 80 MG/1
1 TABLET, FILM COATED ORAL
Qty: 30 | Refills: 0
Start: 2023-11-03 | End: 2023-12-02

## 2023-11-03 RX ORDER — LOSARTAN POTASSIUM 100 MG/1
1 TABLET, FILM COATED ORAL
Qty: 30 | Refills: 0
Start: 2023-11-03 | End: 2023-12-02

## 2023-11-03 RX ORDER — SENNA PLUS 8.6 MG/1
2 TABLET ORAL AT BEDTIME
Refills: 0 | Status: DISCONTINUED | OUTPATIENT
Start: 2023-11-03 | End: 2023-11-03

## 2023-11-03 RX ORDER — SITAGLIPTIN AND METFORMIN HYDROCHLORIDE 500; 50 MG/1; MG/1
1 TABLET, FILM COATED ORAL
Qty: 60 | Refills: 0
Start: 2023-11-03 | End: 2023-12-02

## 2023-11-03 RX ORDER — INSULIN LISPRO 100/ML
5 VIAL (ML) SUBCUTANEOUS
Refills: 0 | Status: DISCONTINUED | OUTPATIENT
Start: 2023-11-03 | End: 2023-11-03

## 2023-11-03 RX ORDER — LIDOCAINE 4 G/100G
1 CREAM TOPICAL
Qty: 14 | Refills: 0
Start: 2023-11-03 | End: 2023-11-16

## 2023-11-03 RX ORDER — INSULIN GLARGINE 100 [IU]/ML
15 INJECTION, SOLUTION SUBCUTANEOUS AT BEDTIME
Refills: 0 | Status: DISCONTINUED | OUTPATIENT
Start: 2023-11-03 | End: 2023-11-03

## 2023-11-03 RX ORDER — GABAPENTIN 400 MG/1
1 CAPSULE ORAL
Refills: 0 | DISCHARGE

## 2023-11-03 RX ADMIN — Medication 4 UNIT(S): at 11:41

## 2023-11-03 RX ADMIN — Medication 2: at 11:41

## 2023-11-03 RX ADMIN — POLYETHYLENE GLYCOL 3350 17 GRAM(S): 17 POWDER, FOR SOLUTION ORAL at 11:06

## 2023-11-03 RX ADMIN — Medication 5 UNIT(S): at 16:37

## 2023-11-03 RX ADMIN — Medication 2: at 16:37

## 2023-11-03 RX ADMIN — LIDOCAINE 1 PATCH: 4 CREAM TOPICAL at 11:07

## 2023-11-03 RX ADMIN — Medication 2: at 07:57

## 2023-11-03 RX ADMIN — GABAPENTIN 200 MILLIGRAM(S): 400 CAPSULE ORAL at 11:07

## 2023-11-03 RX ADMIN — ENOXAPARIN SODIUM 40 MILLIGRAM(S): 100 INJECTION SUBCUTANEOUS at 11:06

## 2023-11-03 RX ADMIN — LOSARTAN POTASSIUM 25 MILLIGRAM(S): 100 TABLET, FILM COATED ORAL at 05:13

## 2023-11-03 RX ADMIN — Medication 4 UNIT(S): at 07:57

## 2023-11-03 RX ADMIN — CITALOPRAM 40 MILLIGRAM(S): 10 TABLET, FILM COATED ORAL at 11:07

## 2023-11-03 NOTE — PROGRESS NOTE ADULT - PROBLEM SELECTOR PLAN 4
- on monthly haldol decanoate, last 10/19  - c/w Celexa 40 mg daily   - no agitation, behaviorally in control and compliant   - follows with OP Bryan
- on monthly haldol decanoate, last 10/19  - c/w Celexa 40 mg daily   - no agitation, behaviorally in control and compliant   - follows with OP Bryan

## 2023-11-03 NOTE — PROGRESS NOTE ADULT - PROBLEM SELECTOR PLAN 1
GPR in 1/4 bottles possibly represent contaminant from skin tk.  Pt non-toxic appearing.  No fevers here.  None noted in ED on 10/27 either.  Denies rashes, diarrhea, dysuria, cough.   - monitor off abx  - f/u final speciation---called [facility] today, state it took 5 days to grown only grew on 11/1, was checked but no growth yet, will check again every 24h x 5 days, other cx that day NG  - f/u repeat cultures taken in ED, f/u GPR in 1/4 bottles possibly represent contaminant from skin tk.  Pt non-toxic appearing.  No fevers here.  None noted in ED on 10/27 either.  Denies rashes, diarrhea, dysuria, cough.   - monitor off abx  - f/u final speciation---called [facility] lab today, state it took 5 days to grown only grew on 11/1, was checked but no growth yet, will check again every 24h x 5 days, still not growing   - repeat cx NG  - WBC wnl, no fevers

## 2023-11-03 NOTE — DISCHARGE NOTE NURSING/CASE MANAGEMENT/SOCIAL WORK - PATIENT PORTAL LINK FT
You can access the FollowMyHealth Patient Portal offered by [facility] by registering at the following website: [email or web address]. By joining [employer]’s FollowMyHealth portal, you will also be able to view your health information using other applications (apps) compatible with our system.

## 2023-11-03 NOTE — CHART NOTE - NSCHARTNOTEFT_GEN_A_CORE
Pt has history of diabetes with currently hgb A1C of 11.3. Endocrine team was called for further recommendation. Ideally insulin is recommended along with oral regimen but pt adamantly refused insulin at this time. Pt refused insulin teaching and fingerstick glucose checking education as well. RN and diabetic champion went to speak to pt for a long time to convince her to get educated but pt refused. As kelsi sensor and reader is covered per pharmacy, pt is agreeable to use kelsi for glucose checking. Pt understands the importance of glucose checking and will go to primary care provider to get the kelsi sensor put on.     Endocrine final rec: discontinue metformin, will restart Janumet 50/1000 BID + Jardiance 10mg daily. No copay. Pt understands the plan. Pt stated that she lives with her mom and her sister. Mom is currently in the hospital and sister is not involved in her care and she takes her care of herself. Pt declined for us to speak to any family members about her plan at this time.     Case was discussed with Endocrine Chantale Yepez and Dr. Marrufo.

## 2023-11-03 NOTE — PROGRESS NOTE ADULT - PROBLEM SELECTOR PLAN 5
no PT needs, able to ambulate independently  dc pending cx, DM management no PT needs, able to ambulate independently  dc home today, OP PCP f/u  dc time 35 min   CM arranging transport home

## 2023-11-03 NOTE — PROGRESS NOTE ADULT - PROBLEM SELECTOR PLAN 2
L>R, whole leg, no trauma, no limiting ambulating, no back in back, no changes in sensation  - home gabapentin 100 mg TID, increase to 200 mg TID  - L spine x-ray L>R, whole leg, no trauma, no limiting ambulating, no back in back, no changes in sensation  - home gabapentin 100 mg TID, increased to 200 mg TID  - L spine x-ray wnl

## 2023-11-03 NOTE — PROGRESS NOTE ADULT - SUBJECTIVE AND OBJECTIVE BOX
Chief Complaint: DM 2 uncontrolled with hyperglycemia     History: Saw patient at bedside. Reports she was just taking Metformin only at home. Agreeable to oral DM medication only. Will not accept GLP nor insulin at this time. Tolerating eating, denies complaints at time of visit.     MEDICATIONS  (STANDING):  atorvastatin 20 milliGRAM(s) Oral at bedtime  citalopram 40 milliGRAM(s) Oral daily  dextrose 50% Injectable 12.5 Gram(s) IV Push once  dextrose 50% Injectable 25 Gram(s) IV Push once  dextrose 50% Injectable 25 Gram(s) IV Push once  enoxaparin Injectable 40 milliGRAM(s) SubCutaneous every 24 hours  gabapentin 200 milliGRAM(s) Oral every 8 hours  insulin glargine Injectable (LANTUS) 15 Unit(s) SubCutaneous at bedtime  insulin lispro (ADMELOG) corrective regimen sliding scale   SubCutaneous three times a day before meals  insulin lispro (ADMELOG) corrective regimen sliding scale   SubCutaneous at bedtime  insulin lispro Injectable (ADMELOG) 5 Unit(s) SubCutaneous three times a day before meals  lidocaine   4% Patch 1 Patch Transdermal daily  losartan 25 milliGRAM(s) Oral daily  polyethylene glycol 3350 17 Gram(s) Oral daily  senna 2 Tablet(s) Oral at bedtime    MEDICATIONS  (PRN):  aluminum hydroxide/magnesium hydroxide/simethicone Suspension 30 milliLiter(s) Oral every 4 hours PRN Dyspepsia  dextrose Oral Gel 15 Gram(s) Oral once PRN Blood Glucose LESS THAN 70 milliGRAM(s)/deciliter  melatonin 3 milliGRAM(s) Oral at bedtime PRN Insomnia    No Known Allergies    Review of Systems:  Constitutional: No fever  Eyes: No blurry vision  Neuro: No tremors  HEENT: No pain  Cardiovascular: No chest pain, palpitations  Respiratory: No SOB, no cough  GI: No nausea, vomiting, abdominal pain  : No dysuria  Endocrine: no polyuria, polydipsia      PHYSICAL EXAM:  VITALS: T(C): 37 (11-03-23 @ 11:47)  T(F): 98.6 (11-03-23 @ 11:47), Max: 99.2 (11-03-23 @ 04:45)  HR: 88 (11-03-23 @ 11:47) (88 - 97)  BP: 141/62 (11-03-23 @ 11:47) (137/65 - 165/85)  RR:  (16 - 17)  SpO2:  (97% - 99%)  Wt(kg): --  GENERAL: NAD  EYES: No proptosis, no lid lag, anicteric  HEENT:  Atraumatic, Normocephalic, moist mucous membranes  RESPIRATORY: Non labored respirations   GI: Soft, nontender, non distended  NEURO: extraocular movements intact, no tremor  PSYCH: Alert and oriented x 3, calm       CAPILLARY BLOOD GLUCOSE      POCT Blood Glucose.: 243 mg/dL (03 Nov 2023 11:20)  POCT Blood Glucose.: 205 mg/dL (03 Nov 2023 07:54)  POCT Blood Glucose.: 226 mg/dL (02 Nov 2023 21:48)  POCT Blood Glucose.: 235 mg/dL (02 Nov 2023 16:34)      11-02    136  |  100  |  5<L>  ----------------------------<  242<H>  3.7   |  23  |  0.44<L>    eGFR: 108    Ca    9.1      11-02  Mg     1.70     11-02  Phos  3.8     11-02    TPro  7.3  /  Alb  4.8  /  TBili  0.5  /  DBili  x   /  AST  14  /  ALT  17  /  AlkPhos  94  11-01            A1C with Estimated Average Glucose Result: 11.3 % (10-27-23 @ 03:00)          Diet, Regular:   Consistent Carbohydrate Evening Snack (CSTCHOSN) (11-01-23 @ 22:27)        
Patient is a 63y old  Female who presents with a chief complaint of Fever, positive blood culture    SUBJECTIVE / OVERNIGHT EVENTS:    No CP, SOB, f/c/n/v  Still with some leg pain but better able to walk  lives with sister and mother    MEDICATIONS  (STANDING):  citalopram 40 milliGRAM(s) Oral once  enoxaparin Injectable 40 milliGRAM(s) SubCutaneous every 24 hours  gabapentin 200 milliGRAM(s) Oral every 8 hours  insulin glargine Injectable (LANTUS) 12 Unit(s) SubCutaneous at bedtime  insulin lispro (ADMELOG) corrective regimen sliding scale   SubCutaneous at bedtime  insulin lispro (ADMELOG) corrective regimen sliding scale   SubCutaneous three times a day before meals  insulin lispro Injectable (ADMELOG) 4 Unit(s) SubCutaneous three times a day before meals  lidocaine   4% Patch 1 Patch Transdermal daily  polyethylene glycol 3350 17 Gram(s) Oral daily    MEDICATIONS  (PRN):  dextrose Oral Gel 15 Gram(s) Oral once PRN Blood Glucose LESS THAN 70 milliGRAM(s)/deciliter  melatonin 3 milliGRAM(s) Oral at bedtime PRN Insomnia    T(C): 36.6 (11-02-23 @ 11:16), Max: 37.5 (11-01-23 @ 16:41)  HR: 90 (11-02-23 @ 11:16) (90 - 116)  BP: 145/78 (11-02-23 @ 11:16) (124/64 - 155/91)  RR: 17 (11-02-23 @ 11:16) (16 - 18)  SpO2: 100% (11-02-23 @ 11:16) (98% - 100%)    CAPILLARY BLOOD GLUCOSE  POCT Blood Glucose.: 287 mg/dL (02 Nov 2023 10:59)  POCT Blood Glucose.: 228 mg/dL (02 Nov 2023 07:28)  POCT Blood Glucose.: 203 mg/dL (02 Nov 2023 00:27)  POCT Blood Glucose.: 201 mg/dL (01 Nov 2023 23:07)  POCT Blood Glucose.: 289 mg/dL (01 Nov 2023 19:54)  POCT Blood Glucose.: 262 mg/dL (01 Nov 2023 17:23)  POCT Blood Glucose.: 308 mg/dL (01 Nov 2023 14:00)    I&O's Summary    02 Nov 2023 07:01  -  02 Nov 2023 13:20  --------------------------------------------------------  IN: 653 mL / OUT: 0 mL / NET: 653 mL    PHYSICAL EXAM:  GENERAL: NAD   CHEST/LUNG: Clear to auscultation bilaterally; No wheeze  HEART: Regular rate and rhythm; No murmurs, rubs, or gallops  ABDOMEN: Soft, Nontender, Nondistended; Bowel sounds present  EXTREMITIES:   warm and well perfused, No clubbing, cyanosis, or edema  PSYCH: AAOx3  NEUROLOGY: non-focal, some subtle tremor of hand  SKIN: No rashes or lesions    LABS:                        12.8   8.41  )-----------( 339      ( 02 Nov 2023 04:16 )             37.1     11-02    136  |  100  |  5<L>  ----------------------------<  242<H>  3.7   |  23  |  0.44<L>    Ca    9.1      02 Nov 2023 04:16  Phos  3.8     11-02  Mg     1.70     11-02    TPro  7.3  /  Alb  4.8  /  TBili  0.5  /  DBili  x   /  AST  14  /  ALT  17  /  AlkPhos  94  11-01    PT/INR - ( 01 Nov 2023 12:31 )   PT: 11.3 sec;   INR: 1.01 ratio    PTT - ( 01 Nov 2023 12:31 )  PTT:28.2 sec    Microbiology: Culture Results:   10,000 - 49,000 CFU/mL Streptococcus agalactiae (Group B) isolated  Group B streptococci are susceptible to ampicillin,  penicillin and cefazolin, but may be resistant to  erythromycin and clindamycin.  <10,000 CFU/ml Normal Urogenital tk present (10-27 @ 03:11)  Culture Results:   Growth in anaerobic bottle: Gram Positive Rods  Direct identification is available within approximately 3-5  hours either by Blood Panel Multiplexed PCR or Direct  MALDI-TOF. Details: [email or web address] (10-27 @ 03:00)  Culture Results:   No growth at 5 days (10-27 @ 02:45)    VBG 11-01 @ 17:15  pH: 7.41/pCO2: 43/pO2: 50/HCO3: 27/lactate: 2.8  VBG 11-01 @ 12:10  pH: 7.40/pCO2: 44/pO2: 37/HCO3: 27/lactate: 2.9    Care Discussed with Consultants/Other Providers:  
Patient is a 63y old  Female who presents with a chief complaint of Fever, positive blood culture     SUBJECTIVE / OVERNIGHT EVENTS:    No CP, SOB, f/c/n/v  reports leg pain is better  denies feeling ill, asking to go home    MEDICATIONS  (STANDING):  atorvastatin 20 milliGRAM(s) Oral at bedtime  citalopram 40 milliGRAM(s) Oral daily  enoxaparin Injectable 40 milliGRAM(s) SubCutaneous every 24 hours  gabapentin 200 milliGRAM(s) Oral every 8 hours  insulin glargine Injectable (LANTUS) 15 Unit(s) SubCutaneous at bedtime  insulin lispro (ADMELOG) corrective regimen sliding scale   SubCutaneous at bedtime  insulin lispro (ADMELOG) corrective regimen sliding scale   SubCutaneous three times a day before meals  insulin lispro Injectable (ADMELOG) 5 Unit(s) SubCutaneous three times a day before meals  lidocaine   4% Patch 1 Patch Transdermal daily  losartan 25 milliGRAM(s) Oral daily  polyethylene glycol 3350 17 Gram(s) Oral daily  senna 2 Tablet(s) Oral at bedtime    MEDICATIONS  (PRN):  aluminum hydroxide/magnesium hydroxide/simethicone Suspension 30 milliLiter(s) Oral every 4 hours PRN Dyspepsia  dextrose Oral Gel 15 Gram(s) Oral once PRN Blood Glucose LESS THAN 70 milliGRAM(s)/deciliter  melatonin 3 milliGRAM(s) Oral at bedtime PRN Insomnia    T(C): 37 (11-03-23 @ 11:47), Max: 37.3 (11-03-23 @ 04:45)  HR: 88 (11-03-23 @ 11:47) (88 - 97)  BP: 141/62 (11-03-23 @ 11:47) (137/65 - 165/85)  RR: 17 (11-03-23 @ 11:47) (16 - 17)  SpO2: 99% (11-03-23 @ 11:47) (97% - 99%)    CAPILLARY BLOOD GLUCOSE  POCT Blood Glucose.: 228 mg/dL (03 Nov 2023 13:11)  POCT Blood Glucose.: 243 mg/dL (03 Nov 2023 11:20)  POCT Blood Glucose.: 205 mg/dL (03 Nov 2023 07:54)  POCT Blood Glucose.: 226 mg/dL (02 Nov 2023 21:48)  POCT Blood Glucose.: 235 mg/dL (02 Nov 2023 16:34)    I&O's Summary    02 Nov 2023 07:01  -  03 Nov 2023 07:00  --------------------------------------------------------  IN: 989 mL / OUT: 0 mL / NET: 989 mL    03 Nov 2023 07:01  -  03 Nov 2023 13:26  --------------------------------------------------------  IN: 598 mL / OUT: 0 mL / NET: 598 mL      PHYSICAL EXAM:  GENERAL: NAD   CHEST/LUNG: Clear to auscultation bilaterally; No wheeze  HEART: Regular rate and rhythm; No murmurs, rubs, or gallops  ABDOMEN: Soft, Nontender, Nondistended; Bowel sounds present  EXTREMITIES:   warm and well perfused, No clubbing, cyanosis, or edema  PSYCH: AAOx3  NEUROLOGY: non-focal, some subtle tremor of hand  SKIN: No rashes or lesions      LABS:                        12.8   8.41  )-----------( 339      ( 02 Nov 2023 04:16 )             37.1     11-02    136  |  100  |  5<L>  ----------------------------<  242<H>  3.7   |  23  |  0.44<L>    Ca    9.1      02 Nov 2023 04:16  Phos  3.8     11-02  Mg     1.70     11-02    Microbiology: Culture Results:   No growth at 24 hours (11-01 @ 14:39)  Culture Results:   No growth at 24 hours (11-01 @ 14:39)  Culture Results:   No growth (11-01 @ 12:10)    VBG 11-01 @ 17:15  pH: 7.41/pCO2: 43/pO2: 50/HCO3: 27/lactate: 2.8    Spine Xray:   No compression fractures, spondylolistheses, or spondylolysis defects.  Disk space heights preserved and facet alignment maintained.  Unremarkable SI joints.  No lytic or blastic lesions.    Care Discussed with Consultants/Other Providers:

## 2023-11-03 NOTE — PROGRESS NOTE ADULT - ASSESSMENT
63F schizoaffective disorder (on IM haldol last 10/19), HTN, and DM type 2 presents to the ED after she was called back for positive blood cultures.
63F schizoaffective disorder (on IM haldol last 10/19), HTN, and DM type 2 presents to the ED after she was called back for positive blood cultures.
  Assessment and Recommendation:   · Assessment	  62 y/o F with pmh of schizoaffective disorder, HTN, and DM type 2 presents to the ED after she was called back for positive blood cultures. Endocrine consulted for diabetes management given A1c of 11.3.    Uncontrolled T2DM (at least 3 years--documented in discharge paperwork from 2020, Per PCP at last visit February 2023 A1c 13.2, eGFR 109, Urinalysis negative for proteinuria, no known opthalmologic/Neuropathy complications)  Patient noted to have A1c of 11.3 on ED labs, FS 300s  Endocrine team consulted for inpatient T2DM management.    Patient does not keep log of blood glucose levels.  Home Regimen Per Med Rec: 1000mg Metformin BID, glimepiride 4mg BID (patient reports taking Metformin only) Denies ever taking injection of any kind  Per PCP; recommended Janumet, Semaglutide, Metformin, Simvastatin however unclear adherence     PCP: Dena Juan  Endocrinologist: None  Care Coordinator: Carolina  at [place] ([phone])    Patient currently not at goal, 200 range, borderline hyperglycemia     #Uncontrolled T2DM    Patient is pending DC home at this time       Discharge Plan:   - Ideal discharge would be basal injection plus oral agent, alternative regimen could be multiple oral agents daily plus weekly injections however, patient declines all injections and primary team reports limited familial ability to help at home  - Would consider optimizing oral DM regimen: Can stop Metformin and resume Janumet 50/1000 mg BID (adding additional oral agent) along with addition of Jardiance 10 mg (cardioprotective given cardiac disease is documented in medical hx review)   - Patient will require glucometer and test strips on discharge to monitor FS pre meal and QHS. Please provide supplies on discharge. Please provide pt edu on checking FS and creating log/record.  - Patient can follow with PCP  - If endocrine follow up is needed, can call [address] [place] [address], [address], [phone]    #HTN:  - Currently ranging -140s (Goal <130/80)  - Would recommend lose dose ARB/ACEi for renal protection and BP management  - Defer management to primary team     #HLD:  - Lipid Profile: Cholesterol 197, TG 99, HDL 52,   - ASCVD risk 21.8%, pt is high risk thus qualifying for statins  - Recommend starting atorvastatin 20mg QHS    Case discussed with ACP VIVIEN Hartman-BC  Nurse Practitioner   Division of Endocrinology  Pager: [phone] pager [phone]    If out of hospital/unavailable when paged, please note: patient will be cared for by another provider on the endocrine service.  Please call the endocrine answering service for assistance to reach covering provider ([phone]). For non-urgent matters, please email [email or web address] for assistance.             Problem/Recommendation - 1:  ·  Problem: Uncontrolled type 2 diabetes mellitus with hyperglycemia.     Problem/Recommendation - 2:  ·  Problem: Hyperlipidemia.     Problem/Recommendation - 3:  ·  Problem: Hypertension.

## 2023-11-03 NOTE — PROGRESS NOTE ADULT - PROBLEM SELECTOR PLAN 3
HbA1c is 11.3%, on metformin 1000 mg BID at home   - c/w EDOUARD  - add lantus 12 units and 4 units with meals  - states would not be able to do insulin at home, asking for more pills  - DM diet  - RD consult  - tanner jones HbA1c is 11.3%, on metformin 1000 mg BID at home   - c/w EDOUARD  - lantus 15 units and 5 units with meals  - states would not be able to do insulin at home, asking for more pills  - DM diet  - RD consult  - endo eval appreciated  - adding orals for dc  - Dr. Juan office called on 11/3 attempted to make appt, state post dc no appt needed can come in as walkin whenever open 10-630pm M-F and Sat 10-2 at [phone] [facility]

## 2023-11-06 LAB
CULTURE RESULTS: SIGNIFICANT CHANGE UP
SPECIMEN SOURCE: SIGNIFICANT CHANGE UP

## 2024-12-16 NOTE — ED PROVIDER NOTE - NS_EDPROVIDERDISPOUSERTYPE_ED_A_ED
Patient has been seen in the last 6 months by clinical staff so refill approved   Attending Attestation (For Attendings USE Only)...

## 2025-03-24 NOTE — PROGRESS NOTE ADULT - PROBLEM SELECTOR PLAN 5
Called patient. No answer. Lvm telling him to call the office back. DMP   BS in the 300s this AM   Will add Lantus 8 units Q HS   Monitor BS   YANG A1 C